# Patient Record
Sex: OTHER/UNKNOWN | Race: BLACK OR AFRICAN AMERICAN | NOT HISPANIC OR LATINO | Employment: FULL TIME | URBAN - METROPOLITAN AREA
[De-identification: names, ages, dates, MRNs, and addresses within clinical notes are randomized per-mention and may not be internally consistent; named-entity substitution may affect disease eponyms.]

---

## 2023-08-10 ENCOUNTER — APPOINTMENT (OUTPATIENT)
Dept: URGENT CARE | Age: 23
End: 2023-08-10
Payer: OTHER MISCELLANEOUS

## 2023-08-10 PROCEDURE — 99213 OFFICE O/P EST LOW 20 MIN: CPT | Performed by: PHYSICIAN ASSISTANT

## 2023-08-24 ENCOUNTER — APPOINTMENT (OUTPATIENT)
Dept: URGENT CARE | Age: 23
End: 2023-08-24
Payer: OTHER MISCELLANEOUS

## 2023-08-24 ENCOUNTER — EVALUATION (OUTPATIENT)
Dept: PHYSICAL THERAPY | Age: 23
End: 2023-08-24
Payer: OTHER MISCELLANEOUS

## 2023-08-24 DIAGNOSIS — M25.511 ACUTE PAIN OF RIGHT SHOULDER: Primary | ICD-10-CM

## 2023-08-24 PROCEDURE — 99213 OFFICE O/P EST LOW 20 MIN: CPT | Performed by: PHYSICIAN ASSISTANT

## 2023-08-24 PROCEDURE — 97161 PT EVAL LOW COMPLEX 20 MIN: CPT | Performed by: PHYSICAL THERAPIST

## 2023-08-24 PROCEDURE — 97110 THERAPEUTIC EXERCISES: CPT | Performed by: PHYSICAL THERAPIST

## 2023-08-24 NOTE — PROGRESS NOTES
PT Evaluation     Today's date: 2023  Patient name: Nando Posada  : 2000  MRN: [id number]  Referring provider: Lee Barron PA-C  Dx:   Encounter Diagnosis     ICD-10-CM    1. Acute pain of right shoulder  M25.511                      Assessment  Assessment details: Patient seen for PT evaluation for R shoulder pain. Patient presents with scapular dyskinesia, decreased R UE ROM and strength 2* acute R shoulder pain. PT initiated HEP training, focusing on strengthening the scapula, stretching the R sided cervical spine to reduce R shoulder pain, PT will continue to progress HEP and will progress to ROM exercises once scapular strength improves. Patient is a good candidate for PT. Recommending 2-3x/week x 3 weeks. Impairments: abnormal or restricted ROM, activity intolerance, impaired physical strength, lacks appropriate home exercise program, pain with function, weight-bearing intolerance and poor body mechanics  Functional limitations: Patient with moderate pain with IADLs, reaching, unable to reach > shoulder height, limitations with household chores and currently out of work. Understanding of Dx/Px/POC: good   Prognosis: good    Goals  Impairment Goals to be met within 4 weeks. - Decrease pain to 2/10 at worst  - Improve ROM by 10-15 degrees R shoulder flexion, abduction  - Increase strength to 5/5 throughout     Functional Goals to be met within 4-6 weeks. - Return to Prior Level of Function  - Increase Functional Status Measure to: expected  - Patient will be independent with HEP  - Patient to be able to return to work.         Plan  Patient would benefit from: skilled physical therapy  Planned modality interventions: cryotherapy, thermotherapy: hydrocollator packs and unattended electrical stimulation  Planned therapy interventions: joint mobilization, IASTM, kinesiology taping, manual therapy, patient education, postural training, strengthening, stretching, therapeutic activities, therapeutic exercise, home exercise program, gait training and body mechanics training  Frequency: 2x week  Duration in weeks: 3  Treatment plan discussed with: patient        Subjective Evaluation    History of Present Illness  Date of onset: 2023  Mechanism of injury: Patient works for [employer] and hurt her shoulder when she was wrapping her pallet. Patient talked to her work and was recommended to see [facility] the following day 2* severe R shoulder pain. Patient initially given work restrictions and medications, no relief of pain; followed up recently and was recommended PT for R shoulder pain. Patient works full time [employer], bending, lifting, walking, pulling; warehouse work.    Patient Goals  Patient goals for therapy: decreased pain, increased motion, increased strength and return to work    Pain  Current pain ratin  At best pain ratin  At worst pain ratin  Location: R shoulder  Quality: sharp, dull ache, cramping and discomfort  Relieving factors: medications  Aggravating factors: overhead activity and lifting  Progression: no change    Social Support    Employment status: working  Hand dominance: right      Diagnostic Tests  No diagnostic tests performed  Treatments  No previous or current treatments        Objective     Postural Observations  Seated posture: fair  Standing posture: fair        Neurological Testing     Sensation     Shoulder   Left Shoulder   Intact: light touch    Right Shoulder   Intact: light touch    Active Range of Motion   Cervical/Thoracic Spine       Cervical    Left lateral flexion:  Restriction level: minimal  Right lateral flexion:  with pain Restriction level minimal  Left rotation:  WFL  Right rotation:  [facility] and with pain  Left Shoulder   Normal active range of motion    Right Shoulder   Flexion: 120 degrees with pain  Abduction: 105 degrees with pain  External rotation BTH: C2 with pain  Internal rotation BTB: sacrum with pain    Left Elbow   Normal active range of motion    Right Elbow   Normal active range of motion    Scapular Mobility   Left Shoulder   Scapular mobility: WFL    Right Shoulder   Scapular mobility: fair    Strength/Myotome Testing     Left Shoulder   Normal muscle strength    Right Shoulder     Planes of Motion   Flexion: 3+   Abduction: 3+   External rotation at 0°: 4-   Internal rotation at 0°: 4-     Left Elbow   Normal strength    Right Elbow   Normal strength             Precautions: none      Manuals 8/24                                       Neuro Re-Ed                                                                Ther Ex                pulleys        scap retraction HEP       TB rows and extension; B        Wall slides flexion? Doorway pec stretch, arms down, R or B HEP               Supine cane flex? S/l shoulder abd?         S/l ER                Prone shld flex, abd, ext' R        Prone rows; R                                        Ther Activity                        Gait Training                        Modalities        CP PRN

## 2023-08-29 ENCOUNTER — OFFICE VISIT (OUTPATIENT)
Dept: PHYSICAL THERAPY | Age: 23
End: 2023-08-29
Payer: OTHER MISCELLANEOUS

## 2023-08-29 DIAGNOSIS — M25.511 ACUTE PAIN OF RIGHT SHOULDER: Primary | ICD-10-CM

## 2023-08-29 PROCEDURE — 97110 THERAPEUTIC EXERCISES: CPT

## 2023-08-29 NOTE — PROGRESS NOTES
Daily Note     Today's date: 2023  Patient name: Yen Laboy  : 2000  MRN: [id number]  Referring provider: Yumiko Ruiz PA-C  Dx:   Encounter Diagnosis     ICD-10-CM    1. Acute pain of right shoulder  M25.511           Start Time: 930  Stop Time: 1015  Total time in clinic (min): 45 minutes    Subjective: Patient reports she has increased right posterior shoulder pain today. Rates pain 6/10 today. Objective: See treatment diary below      Assessment: Patient had fair tolerance to session today. Patient reports discomfort with right shoulder AROM and AAROM. Patient requests ice post treatment but no increased pain reported post treatment. Pt will benefit from continued skilled PT intervention in order to address remaining limitations and to restore maximal function. Plan: Continue per plan of care. Precautions: none      Manuals                                       Neuro Re-Ed                                                                Ther Ex                pulleys  5'      scap retraction HEP 5"x20      TB rows and extension; B  RTB 5" x10      Wall slides flexion? Doorway pec stretch, arms down, R or B HEP 15"x5 arms down              Supine cane flex?   5" x10      S/l shoulder abd?  x10      S/l ER  2x10              Prone shld flex, abd, ext' R  x10 ea      Prone rows; R  x10                                      Ther Activity                        Gait Training                        Modalities        CP PRN  x10

## 2023-08-31 ENCOUNTER — OFFICE VISIT (OUTPATIENT)
Dept: PHYSICAL THERAPY | Age: 23
End: 2023-08-31
Payer: OTHER MISCELLANEOUS

## 2023-08-31 DIAGNOSIS — M25.511 ACUTE PAIN OF RIGHT SHOULDER: Primary | ICD-10-CM

## 2023-08-31 PROCEDURE — 97110 THERAPEUTIC EXERCISES: CPT

## 2023-08-31 NOTE — PROGRESS NOTES
Daily Note     Today's date: 2023  Patient name: Jennifer Simmons  : 2000  MRN: [id number]  Referring provider: Daquan Ingram PA-C  Dx:   Encounter Diagnosis     ICD-10-CM    1. 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 Objective: See treatment diary below      Assessment: Patient tolerated treatment well. UBE added today without difficulty or complaints of discomfort. Patient showed improved tolerance to all exercises today. Pt will benefit from continued skilled PT intervention in order to address remaining limitations and to restore maximal function. No increased pain reported post treatment. Plan: Continue per plan of care. Precautions: none      Manuals                                      Neuro Re-Ed                                                                Ther Ex                pulleys  5' 5'     scap retraction HEP 5"x20 5" x20     TB rows and extension; B  RTB 5" x10 RTB x15     Wall slides flexion? x10             Doorway pec stretch, arms down, R or B HEP 15"x5 arms down 15" x5 arms down             Supine cane flex?   5" x10 5" x10      S/l shoulder abd?  x10 x10     S/l ER  2x10 2x10             Prone shld flex, abd, ext' R  x10 ea x10 ea     Prone rows; R  x10 x10        UBE 2'/2'                             Ther Activity                        Gait Training                        Modalities        CP PRN  x10 x10'

## 2023-09-06 ENCOUNTER — OFFICE VISIT (OUTPATIENT)
Dept: PHYSICAL THERAPY | Age: 23
End: 2023-09-06
Payer: OTHER MISCELLANEOUS

## 2023-09-06 DIAGNOSIS — M25.511 ACUTE PAIN OF RIGHT SHOULDER: Primary | ICD-10-CM

## 2023-09-06 PROCEDURE — 97110 THERAPEUTIC EXERCISES: CPT

## 2023-09-06 NOTE — PROGRESS NOTES
Daily Note     Today's date: 2023  Patient name: Inés Baez  : 2000  MRN: [id number]  Referring provider: Cheryl Lee PA-C  Dx:   Encounter Diagnosis     ICD-10-CM    1. Acute pain of right shoulder  M25.511                      Subjective: Pt reports no new issue or concerns since the last session, pain is improving since last session. Objective: See treatment diary below      Assessment: Tolerated treatment well. Patient able to complete program without pain or increase in symptoms. Patient overall is progressing towards current goals. Patient exhibited good technique with therapeutic exercises. Pt would benefit from further PT for pain management, ROM and strength. Plan: Continue per plan of care. Precautions: none      Manuals 2023                                      Neuro Re-Ed                                                                Ther Ex                pulleys  5' 5' 5'    scap retraction HEP 5"x20 5" x20 5" x20    TB rows and extension; B  RTB 5" x10 RTB x15 Blue x15    Wall slides flexion? x10 x10            Doorway pec stretch, arms down, R or B HEP 15"x5 arms down 15" x5 arms down 15" x5 arms down            Supine cane flex?   5" x10 5" x10  5" x10     S/l shoulder abd?  x10 x10 x10    S/l ER  2x10 2x10 2x10            Prone shld flex, abd, ext' R  x10 ea x10 ea Prone shld flex, abd, ext' R    Prone rows; R  x10 x10 Prone rows; R       UBE 2'/2' UBE 2'/2'                            Ther Activity                        Gait Training                        Modalities        CP PRN  x10 x10' x10

## 2023-09-07 ENCOUNTER — APPOINTMENT (OUTPATIENT)
Dept: URGENT CARE | Age: 23
End: 2023-09-07
Payer: OTHER MISCELLANEOUS

## 2023-09-07 PROCEDURE — 99213 OFFICE O/P EST LOW 20 MIN: CPT | Performed by: PHYSICIAN ASSISTANT

## 2023-09-08 ENCOUNTER — OFFICE VISIT (OUTPATIENT)
Dept: PHYSICAL THERAPY | Age: 23
End: 2023-09-08
Payer: OTHER MISCELLANEOUS

## 2023-09-08 DIAGNOSIS — M25.511 ACUTE PAIN OF RIGHT SHOULDER: Primary | ICD-10-CM

## 2023-09-08 PROCEDURE — 97110 THERAPEUTIC EXERCISES: CPT | Performed by: PHYSICAL THERAPIST

## 2023-09-08 NOTE — PROGRESS NOTES
Daily Note /Discharge Summary    Today's date: 2023  Patient name: Estela Salcido  : 2000  MRN: [id number]  Referring provider: Kelli Forrester PA-C  Dx:   Encounter Diagnosis     ICD-10-CM    1. Acute pain of right shoulder  M25.511                      Subjective: Patient was recommended to return to work from [employer]. Patient reports her shoulder pain has reduced since onset of PT. Objective: See treatment diary below  ROM - WNL   MMT - WNL R shoulder      Assessment: Tolerated treatment well. PT reviewed HEP, recommended patient to continue with her HEP at home and to progress her scapular strength to be able to support her arm as she does heavy lifting and her job is repetitive. Plan: Discharge PT to [facility]. Precautions: none      Manuals 2023                                   Neuro Re-Ed                                                                Ther Ex                pulleys  5' 5' 5' 5'   scap retraction HEP 5"x20 5" x20 5" x20    TB rows and extension; B  RTB 5" x10 RTB x15 Blue x15 Blue 20x ea   Wall slides flexion? x10 x10 -           Doorway pec stretch, arms down, R or B HEP 15"x5 arms down 15" x5 arms down 15" x5 arms down -           Supine cane flex?   5" x10 5" x10  5" x10  10x:05   S/l shoulder abd?  x10 x10 x10 10x   S/l ER  2x10 2x10 2x10 10x           Prone shld flex, abd, ext' R  x10 ea x10 ea Prone shld flex, abd, ext' R 10x ea   Prone rows; R  x10 x10 Prone rows; R 10x R      UBE 2'/2' UBE 2'/2' 2/2 UBE                           Ther Activity                        Gait Training                        Modalities        CP PRN  x10 x10' x10 x10'

## 2024-04-19 ENCOUNTER — HOSPITAL ENCOUNTER (EMERGENCY)
Facility: HOSPITAL | Age: 24
Discharge: HOME | End: 2024-04-19
Attending: EMERGENCY MEDICINE | Admitting: EMERGENCY MEDICINE
Payer: COMMERCIAL

## 2024-04-19 ENCOUNTER — APPOINTMENT (EMERGENCY)
Dept: ULTRASOUND IMAGING | Facility: HOSPITAL | Age: 24
End: 2024-04-19
Payer: COMMERCIAL

## 2024-04-19 VITALS
TEMPERATURE: 99 F | SYSTOLIC BLOOD PRESSURE: 116 MMHG | OXYGEN SATURATION: 100 % | HEART RATE: 90 BPM | DIASTOLIC BLOOD PRESSURE: 58 MMHG | RESPIRATION RATE: 18 BRPM

## 2024-04-19 DIAGNOSIS — O26.859 SPOTTING IN EARLY PREGNANCY: ICD-10-CM

## 2024-04-19 DIAGNOSIS — B34.9 VIRAL SYNDROME: Primary | ICD-10-CM

## 2024-04-19 LAB
ABO GROUP BLD: NORMAL
ANION GAP SERPL CALCULATED.3IONS-SCNC: 8 MMOL/L (ref 4–13)
B-HCG SERPL-ACNC: ABNORMAL MIU/ML (ref 0–0.6)
BACTERIA UR QL AUTO: ABNORMAL /HPF
BASOPHILS # BLD AUTO: 0.02 THOUSANDS/ÂΜL (ref 0–0.1)
BASOPHILS NFR BLD AUTO: 0 % (ref 0–1)
BILIRUB UR QL STRIP: NEGATIVE
BUN SERPL-MCNC: 11 MG/DL (ref 5–25)
CALCIUM SERPL-MCNC: 9 MG/DL (ref 8.4–10.2)
CHLORIDE SERPL-SCNC: 101 MMOL/L (ref 96–108)
CLARITY UR: CLEAR
CO2 SERPL-SCNC: 27 MMOL/L (ref 21–32)
COLOR UR: ABNORMAL
CREAT SERPL-MCNC: 0.68 MG/DL (ref 0.6–1.3)
EOSINOPHIL # BLD AUTO: 0.06 THOUSAND/ÂΜL (ref 0–0.61)
EOSINOPHIL NFR BLD AUTO: 1 % (ref 0–6)
ERYTHROCYTE [DISTWIDTH] IN BLOOD BY AUTOMATED COUNT: 13 % (ref 11.6–15.1)
FLUAV RNA RESP QL NAA+PROBE: NEGATIVE
FLUBV RNA RESP QL NAA+PROBE: NEGATIVE
GLUCOSE SERPL-MCNC: 86 MG/DL (ref 65–140)
GLUCOSE UR STRIP-MCNC: NEGATIVE MG/DL
HCT VFR BLD AUTO: 35.2 % (ref 36.5–46.1)
HGB BLD-MCNC: 11.9 G/DL (ref 12–15.4)
HGB UR QL STRIP.AUTO: NEGATIVE
IMM GRANULOCYTES # BLD AUTO: 0.03 THOUSAND/UL (ref 0–0.2)
IMM GRANULOCYTES NFR BLD AUTO: 0 % (ref 0–2)
KETONES UR STRIP-MCNC: NEGATIVE MG/DL
LEUKOCYTE ESTERASE UR QL STRIP: NEGATIVE
LIPASE SERPL-CCNC: 17 U/L (ref 11–82)
LYMPHOCYTES # BLD AUTO: 2.62 THOUSANDS/ÂΜL (ref 0.6–4.47)
LYMPHOCYTES NFR BLD AUTO: 28 % (ref 14–44)
MCH RBC QN AUTO: 29.8 PG (ref 26.8–34.3)
MCHC RBC AUTO-ENTMCNC: 33.8 G/DL (ref 31.4–37.4)
MCV RBC AUTO: 88 FL (ref 82–98)
MONOCYTES # BLD AUTO: 0.69 THOUSAND/ÂΜL (ref 0.17–1.22)
MONOCYTES NFR BLD AUTO: 7 % (ref 4–12)
MUCOUS THREADS UR QL AUTO: ABNORMAL
NEUTROPHILS # BLD AUTO: 6.12 THOUSANDS/ÂΜL (ref 1.85–7.62)
NEUTS SEG NFR BLD AUTO: 64 % (ref 43–75)
NITRITE UR QL STRIP: NEGATIVE
NON-SQ EPI CELLS URNS QL MICRO: ABNORMAL /HPF
NRBC BLD AUTO-RTO: 0 /100 WBCS
PH UR STRIP.AUTO: 6 [PH]
PLATELET # BLD AUTO: 291 THOUSANDS/UL (ref 149–390)
PMV BLD AUTO: 9.3 FL (ref 8.9–12.7)
POTASSIUM SERPL-SCNC: 3.5 MMOL/L (ref 3.5–5.3)
PROT UR STRIP-MCNC: ABNORMAL MG/DL
RBC # BLD AUTO: 3.99 MILLION/UL (ref 3.88–5.12)
RBC #/AREA URNS AUTO: ABNORMAL /HPF
RH BLD: POSITIVE
RSV RNA RESP QL NAA+PROBE: NEGATIVE
S PYO DNA THROAT QL NAA+PROBE: NOT DETECTED
SARS-COV-2 RNA RESP QL NAA+PROBE: NEGATIVE
SODIUM SERPL-SCNC: 136 MMOL/L (ref 135–147)
SP GR UR STRIP.AUTO: 1.03 (ref 1–1.03)
UROBILINOGEN UR STRIP-ACNC: <2 MG/DL
WBC # BLD AUTO: 9.54 THOUSAND/UL (ref 4.31–10.16)
WBC #/AREA URNS AUTO: ABNORMAL /HPF

## 2024-04-19 PROCEDURE — 96366 THER/PROPH/DIAG IV INF ADDON: CPT

## 2024-04-19 PROCEDURE — 85025 COMPLETE CBC W/AUTO DIFF WBC: CPT

## 2024-04-19 PROCEDURE — 87651 STREP A DNA AMP PROBE: CPT

## 2024-04-19 PROCEDURE — 0241U HB NFCT DS VIR RESP RNA 4 TRGT: CPT

## 2024-04-19 PROCEDURE — 96365 THER/PROPH/DIAG IV INF INIT: CPT

## 2024-04-19 PROCEDURE — 86900 BLOOD TYPING SEROLOGIC ABO: CPT | Performed by: EMERGENCY MEDICINE

## 2024-04-19 PROCEDURE — 99284 EMERGENCY DEPT VISIT MOD MDM: CPT

## 2024-04-19 PROCEDURE — 86308 HETEROPHILE ANTIBODY SCREEN: CPT | Performed by: EMERGENCY MEDICINE

## 2024-04-19 PROCEDURE — 36415 COLL VENOUS BLD VENIPUNCTURE: CPT

## 2024-04-19 PROCEDURE — 84702 CHORIONIC GONADOTROPIN TEST: CPT

## 2024-04-19 PROCEDURE — 99284 EMERGENCY DEPT VISIT MOD MDM: CPT | Performed by: EMERGENCY MEDICINE

## 2024-04-19 PROCEDURE — 76801 OB US < 14 WKS SINGLE FETUS: CPT

## 2024-04-19 PROCEDURE — 86901 BLOOD TYPING SEROLOGIC RH(D): CPT | Performed by: EMERGENCY MEDICINE

## 2024-04-19 PROCEDURE — 80048 BASIC METABOLIC PNL TOTAL CA: CPT

## 2024-04-19 PROCEDURE — 83690 ASSAY OF LIPASE: CPT

## 2024-04-19 PROCEDURE — 81001 URINALYSIS AUTO W/SCOPE: CPT

## 2024-04-19 RX ADMIN — SODIUM CHLORIDE, SODIUM LACTATE, POTASSIUM CHLORIDE, AND CALCIUM CHLORIDE 1000 ML: .6; .31; .03; .02 INJECTION, SOLUTION INTRAVENOUS at 19:45

## 2024-04-19 NOTE — Clinical Note
Ayesha Tomlinson was seen and treated in our emergency department on 4/19/2024.                Diagnosis:     Ayesha  may return to work on return date.    Ayesha may return on this date: 04/22/2024    Please also allow Ayesha to drink as frequently they need to while at work.     If you have any questions or concerns, please don't hesitate to call.      Renetta Williamson, DO    ______________________________           _______________          _______________  Hospital Representative                              Date                                Time

## 2024-04-19 NOTE — ED ATTENDING ATTESTATION
4/19/2024  I, Chan Hernandez MD, saw and evaluated the patient. I have discussed the patient with the resident/non-physician practitioner and agree with the resident's/non-physician practitioner's findings, Plan of Care, and MDM as documented in the resident's/non-physician practitioner's note, except where noted. All available labs and Radiology studies were reviewed.  I was present for key portions of any procedure(s) performed by the resident/non-physician practitioner and I was immediately available to provide assistance.       At this point I agree with the current assessment done in the Emergency Department.  I have conducted an independent evaluation of this patient a history and physical is as follows:    23-year-old female G2, P1 at about 6 weeks gestation presented for evaluation of some vaginal spotting last night into early this morning which has since improved.  Also notes flulike symptoms since yesterday.  Having fevers as well as some fatigue, cough.    ED Course  ED Course as of 04/20/24 0037   Fri Apr 19, 2024 2046 ABO Grouping: O   2046 Rh Factor: Positive   2046 HCG QUANTITATIVE(!): 100,157.1   2147 Ultrasound shows live IUP at 6 weeks 5 days.  Plan discharge home.  Routine OB/GYN follow-up.     COVID/flu/RSV negative.  Plan discharge home.  Continue routine prenatal care.  Tylenol as needed for any discomfort.  Increase fluid intake.    Critical Care Time  Procedures

## 2024-04-19 NOTE — ED PROVIDER NOTES
History  Chief Complaint   Patient presents with    Flu Symptoms     Patient reports flu like symptoms for past 2 days. States she called off work yesterday for fatigue and fever.    Vaginal Bleeding - Pregnant     States she began with vaginal bleeding. Describes as spotting. Reporting L sided abd cramping. Rates pain 4/10     HPI  23-year-old nonbinary person who presents with lower abdominal pain and vaginal spotting.  Patient describes left-sided abdominal pain described as cramping.  Patient states that they had a positive pregnancy test, G2, P1.  Has not had formal ultrasound.  Does have an appointment with OB.  Associated mild nausea that is resolved.  Also reports associated fatigue, cough, congestion, rhinorrhea.  Last menstrual period 3/4/2024.      Prior to Admission Medications   Prescriptions Last Dose Informant Patient Reported? Taking?   amoxicillin-clavulanate (AUGMENTIN) 875-125 mg per tablet   Yes No   Sig: Take 1 tablet by mouth every 12 (twelve) hours   ergocalciferol (VITAMIN D2) 50,000 units   No No   Sig: Take 1 capsule (50,000 Units total) by mouth once a week   ibuprofen (MOTRIN) 400 mg tablet   No No   Sig: Take 1 tablet (400 mg total) by mouth every 6 (six) hours as needed for mild pain for up to 5 days   norelgestromin-ethinyl estradiol (ORTHO EVRA) 150-35 MCG/24HR   Yes No   Sig: Place 1 patch on the skin once a week   varenicline (CHANTIX) 0.5 mg tablet   No No   Sig: TAKE ONE TABLET BY MOUTH DAILY FOR 3 DAYS THEN ONE TABLET TWICE DAILY FOR 4 DAYS   varenicline (CHANTIX) 1 mg tablet   No No   Sig: Take 1 tablet (1 mg total) by mouth 2 (two) times a day      Facility-Administered Medications: None       Past Medical History:   Diagnosis Date    Allergic     Anemia     Anxiety     Depression     Headache(784.0)        Past Surgical History:   Procedure Laterality Date     SECTION      CHOLECYSTECTOMY         No family history on file.  I have reviewed and agree with the history as  documented.    E-Cigarette/Vaping    E-Cigarette Use Never User      E-Cigarette/Vaping Substances     Social History     Tobacco Use    Smoking status: Every Day     Current packs/day: 0.50     Types: Cigarettes    Smokeless tobacco: Never   Vaping Use    Vaping status: Never Used   Substance Use Topics    Alcohol use: Yes     Alcohol/week: 6.0 - 8.0 standard drinks of alcohol     Types: 2 Glasses of wine, 4 - 6 Shots of liquor per week    Drug use: Yes     Types: Marijuana     Comment: rare        Review of Systems   Constitutional:  Negative for chills and fever.   HENT:  Positive for congestion and rhinorrhea. Negative for ear pain and sore throat.    Eyes:  Negative for pain and visual disturbance.   Respiratory:  Positive for cough. Negative for shortness of breath.    Cardiovascular:  Negative for chest pain and palpitations.   Gastrointestinal:  Positive for abdominal pain and nausea. Negative for blood in stool, constipation, diarrhea and vomiting.   Genitourinary:  Positive for vaginal bleeding. Negative for dysuria, hematuria and vaginal discharge.   Musculoskeletal:  Negative for arthralgias and back pain.   Skin:  Negative for color change and rash.   Neurological:  Negative for seizures, syncope, light-headedness and headaches.   All other systems reviewed and are negative.      Physical Exam  ED Triage Vitals [04/19/24 1855]   Temperature Pulse Respirations Blood Pressure SpO2   99 °F (37.2 °C) 90 18 116/58 100 %      Temp Source Heart Rate Source Patient Position - Orthostatic VS BP Location FiO2 (%)   Oral -- -- -- --      Pain Score       4             Orthostatic Vital Signs  Vitals:    04/19/24 1855   BP: 116/58   Pulse: 90       Physical Exam  Vitals and nursing note reviewed.   Constitutional:       General: Ayesha is not in acute distress.     Appearance: Ayesha is well-developed.   HENT:      Head: Normocephalic and atraumatic.      Mouth/Throat:      Mouth: Mucous membranes are moist.    Eyes:      Conjunctiva/sclera: Conjunctivae normal.   Cardiovascular:      Rate and Rhythm: Normal rate and regular rhythm.      Heart sounds: No murmur heard.  Pulmonary:      Effort: Pulmonary effort is normal. No respiratory distress.      Breath sounds: Normal breath sounds.   Abdominal:      General: Bowel sounds are normal.      Palpations: Abdomen is soft.      Tenderness: There is abdominal tenderness in the suprapubic area and left lower quadrant. There is no right CVA tenderness, left CVA tenderness, guarding or rebound.   Musculoskeletal:         General: No swelling.      Cervical back: Neck supple.   Skin:     General: Skin is warm and dry.      Capillary Refill: Capillary refill takes less than 2 seconds.   Neurological:      Mental Status: Ayesha is alert.   Psychiatric:         Mood and Affect: Mood normal.         ED Medications  Medications   lactated ringers bolus 1,000 mL (0 mL Intravenous Stopped 4/19/24 2149)       Diagnostic Studies  Results Reviewed       Procedure Component Value Units Date/Time    Mononucleosis screen [[id number]]  (Normal) Collected: 04/19/24 1936    Lab Status: Final result Specimen: Blood from Arm, Right Updated: 04/20/24 1429     Monotest Negative    Quantitative hCG [[id number]]  (Abnormal) Collected: 04/19/24 1935    Lab Status: Final result Specimen: Blood from Arm, Right Updated: 04/19/24 2038     HCG, Quant 100,157.1 mIU/mL     Narrative:       Expected Ranges:    HCG results between 5.0 and 25.0 mIU/mL may be indicative of early pregnancy but should be interpreted in light of the total clinical presentation.    HCG can rise to detectable levels in eric and post menopausal women (0-11.6 mIU/mL).     Approximate               Approximate HCG  Gestation age          Concentration ( mIU/mL)  _____________          ______________________   Weeks                      HCG values  0.2-1                       5-50  1-2                           2-3                          100-5000  3-4                         500-11764  4-5                         1000-75820  5-6                         76132-077789  6-8                         10295-408587  8-12                        08779-192778      FLU/RSV/COVID - if FLU/RSV clinically relevant [[id number]]  (Normal) Collected: 04/19/24 1935    Lab Status: Final result Specimen: Nares from Nose Updated: 04/19/24 2028     SARS-CoV-2 Negative     INFLUENZA A PCR Negative     INFLUENZA B PCR Negative     RSV PCR Negative    Narrative:      FOR PEDIATRIC PATIENTS - copy/paste COVID Guidelines URL to browser: [email or web address]    SARS-CoV-2 assay is a Nucleic Acid Amplification assay intended for the  qualitative detection of nucleic acid from SARS-CoV-2 in nasopharyngeal  swabs. Results are for the presumptive identification of SARS-CoV-2 RNA.    Positive results are indicative of infection with SARS-CoV-2, the virus  causing COVID-19, but do not rule out bacterial infection or co-infection  with other viruses. Laboratories within the [place] and its  territories are required to report all positive results to the appropriate  public health authorities. Negative results do not preclude SARS-CoV-2  infection and should not be used as the sole basis for treatment or other  patient management decisions. Negative results must be combined with  clinical observations, patient history, and epidemiological information.  This test has not been FDA cleared or approved.    This test has been authorized by FDA under an Emergency Use Authorization  (EUA). This test is only authorized for the duration of time the  declaration that circumstances exist justifying the authorization of the  emergency use of an in vitro diagnostic tests for detection of SARS-CoV-2  virus and/or diagnosis of COVID-19 infection under section 564(b)(1) of  the Act, 21 U.S.C. 360bbb-3(b)(1), unless the authorization is  terminated  or revoked sooner. The test has been validated but independent review by FDA  and CLIA is pending.    Test performed using [employer] Gene[employer]pert: This RT-PCR assay targets N2,  a region unique to SARS-CoV-2. A conserved region in the E-gene was chosen  for pan-Sarbecovirus detection which includes SARS-CoV-2.    According to CMS-2020-01-R, this platform meets the definition of high-throughput technology.    Strep A PCR [[id number]]  (Normal) Collected: 04/19/24 1935    Lab Status: Final result Specimen: Throat Updated: 04/19/24 2012     STREP A PCR Not Detected    Basic metabolic panel [[id number]] Collected: 04/19/24 1935    Lab Status: Final result Specimen: Blood from Arm, Right Updated: 04/19/24 2008     Sodium 136 mmol/L      Potassium 3.5 mmol/L      Chloride 101 mmol/L      CO2 27 mmol/L      ANION GAP 8 mmol/L      BUN 11 mg/dL      Creatinine 0.68 mg/dL      Glucose 86 mg/dL      Calcium 9.0 mg/dL      eGFR --    Narrative:      Notes:     1. eGFR calculation is only valid for adults 18 years and older.  2. EGFR calculation cannot be performed for patients who are transgender, non-binary, or whose legal sex, sex at birth, and gender identity differ.    Lipase [[id number]]  (Normal) Collected: 04/19/24 1935    Lab Status: Final result Specimen: Blood from Arm, Right Updated: 04/19/24 2008     Lipase 17 u/L     Urine Microscopic [[id number]]  (Abnormal) Collected: 04/19/24 1945    Lab Status: Final result Specimen: Urine, Clean Catch Updated: 04/19/24 1957     RBC, UA 1-2 /hpf      WBC, UA 1-2 /hpf      Epithelial Cells Occasional /hpf      Bacteria, UA None Seen /hpf      MUCUS THREADS Occasional    UA w Reflex to Microscopic w Reflex to Culture [[id number]]  (Abnormal) Collected: 04/19/24 1945    Lab Status: Final result Specimen: Urine, Clean Catch Updated: 04/19/24 1956     Color, UA Light Yellow     Clarity, UA Clear     Specific Gravity, UA 1.031     pH, UA 6.0     Leukocytes, UA Negative      Nitrite, UA Negative     Protein, UA Trace mg/dl      Glucose, UA Negative mg/dl      Ketones, UA Negative mg/dl      Urobilinogen, UA <2.0 mg/dl      Bilirubin, UA Negative     Occult Blood, UA Negative    CBC and differential [[id number]]  (Abnormal) Collected: 04/19/24 1935    Lab Status: Final result Specimen: Blood from Arm, Right Updated: 04/19/24 1943     WBC 9.54 Thousand/uL      RBC 3.99 Million/uL      Hemoglobin 11.9 g/dL      Hematocrit 35.2 %      MCV 88 fL      MCH 29.8 pg      MCHC 33.8 g/dL      RDW 13.0 %      MPV 9.3 fL      Platelets 291 Thousands/uL      nRBC 0 /100 WBCs      Segmented % 64 %      Immature Grans % 0 %      Lymphocytes % 28 %      Monocytes % 7 %      Eosinophils Relative 1 %      Basophils Relative 0 %      Absolute Neutrophils 6.12 Thousands/µL      Absolute Immature Grans 0.03 Thousand/uL      Absolute Lymphocytes 2.62 Thousands/µL      Absolute Monocytes 0.69 Thousand/µL      Eosinophils Absolute 0.06 Thousand/µL      Basophils Absolute 0.02 Thousands/µL                    US OB < 14 weeks with transvaginal   Final Result by Vazquez Gillis MD (04/19 2139)      Single live intrauterine gestation at 6 weeks 5 days (range +/- 3 days).      TERESA of 12/8/2024.            Workstation performed: [id number]               Procedures  POC Pelvic US    Date/Time: 4/19/2024 7:57 PM    Performed by: Renetta Williamson DO  Authorized by: Renetta Williamson DO    Procedure details:     Exam Type:  Diagnostic    Indications: evaluate for IUP, pregnant with abdominal pain and pregnant with vaginal bleeding      Assessment for: free pelvic fluid      Assessment for comment:  FHT    Technique:  Transabdominal obstetric (HCG+) exam    Views obtained: left adnexa, right adnexa, uterus (transverse and sagittal) and pouch of Humza      Image availability:  Images available in PACS  Uterine findings:     Yolk sac: identified      Fetal pole: identified      Fetal heart rate: identified       Fetal heart rate (bpm):  128  Right adnexa findings:     Right ovary:  Visualized  Left adnexa findings:     Left ovary:  Visualized  Interpretation:     Pregnancy findings: intrauterine pregnancy (IUP)    Comments:      Will still obtain formal transvaginal US        ED Course  ED Course as of 04/23/24 0247   Fri Apr 19, 2024 1945 CBC and differential(!)  No leukocytosis or leukopenia.  Hemoglobin slightly decreased.  Hematocrit equivalent.  Platelets within normal limits.  Differential reassuring.   1956 UA w Reflex to Microscopic w Reflex to Culture(!)  Elevated specific gravity, trace protein, negative leukocytes, negative nitrates.  Consistent with dehydration   1957 Urine Microscopic(!)  No bacteria seen.   2009 LIPASE: 17   2009 Basic metabolic panel  Within normal limits   2021 ABO Grouping: O   2021 Rh Factor: Positive   2021 STREP A PCR: Not Detected   2034 SARS-COV-2: Negative   2034 INFLU A PCR: Negative   2034 INFLU B PCR: Negative   2034 RSV PCR: Negative   2038 HCG QUANTITATIVE(!): 100,157.1   2129 Patient tolerating PO   2143 US OB < 14 weeks with transvaginal  IMPRESSION:     Single live intrauterine gestation at 6 weeks 5 days (range +/- 3 days).     TERESA of 12/8/2024.                               SBIRT 22yo+      Flowsheet Row Most Recent Value   Initial Alcohol Screen: US AUDIT-C     1. How often do you have a drink containing alcohol? 0 Filed at: 04/19/2024 1856   2. How many drinks containing alcohol do you have on a typical day you are drinking?  0 Filed at: 04/19/2024 1856   3a. Male UNDER 65: How often do you have five or more drinks on one occasion? 0 Filed at: 04/19/2024 1856   3b. FEMALE Any Age, or MALE 65+: How often do you have 4 or more drinks on one occassion? 0 Filed at: 04/19/2024 1856   Audit-C Score 0 Filed at: 04/19/2024 1856   BETI: How many times in the past year have you...    Used an illegal drug or used a prescription medication for non-medical reasons? Never Filed at:  04/19/2024 1856                  Medical Decision Making  24 yo NB person presented to ED for abd pain vaginal spotting. Associated symptoms: fatigue, cough, congestion, nausea (resolved). Exam findings: heart RRR, lung CTAB, abd soft nonTTP.  Differentials diagnoses considered: IUP vs ectopic vs viral syndrome.  See ED course for more details on lab and imaging interpretation.  Based on these results and H&P, this is most likely due to dehydration, viral syndrome, pregnancy. Results and clinical impressions were discussed with patient and family. They expressed understanding. Plan: Discharge patient home with instruction to follow-up with OB, instructed to follow-up with primary care doctor, instructed to return to the emergency room for any worsening symptoms. This plan was also discussed with patient, who was agreeable with this plan. Patient was given the opportunity to ask questions in ED. All questions and concerns were addressed in ED.     Amount and/or Complexity of Data Reviewed  Labs: ordered. Decision-making details documented in ED Course.  Radiology: ordered. Decision-making details documented in ED Course.          Disposition  Final diagnoses:   Viral syndrome   Spotting in early pregnancy     Time reflects when diagnosis was documented in both MDM as applicable and the Disposition within this note       Time User Action Codes Description Comment    4/19/2024  9:43 PM Renetta Williamson Add [B34.9] Viral syndrome     4/19/2024  9:43 PM Renetta Williamson Add [O20.0] Threatened miscarriage in early pregnancy     4/19/2024  9:45 PM Renetta Williamson Add [O26.859] Spotting in early pregnancy     4/19/2024  9:46 PM Renetta Williamson Remove [O20.0] Threatened miscarriage in early pregnancy           ED Disposition       ED Disposition   Discharge    Condition   Stable    Date/Time   Fri Apr 19, 2024 2145    Comment   Ayesha Tomlinson discharge to home/self care.                   Follow-up Information        Follow up With Specialties Details Why Contact Info Additional Information    Your OBGYN  Call  as soon as possible to schedule follow-up      [facility] Emergency Department Emergency Medicine Go to  As needed, If symptoms worsen [address] [facility] [id number] [phone] [facility] Emergency Department, [address], [place], [address]    Your Primary Care Doctor  Schedule an appointment as soon as possible for a visit  let them know you were evaluated in the ER and would like to schedule a follow-up appointment, As needed              Discharge Medication List as of 4/19/2024  9:46 PM        CONTINUE these medications which have NOT CHANGED    Details   amoxicillin-clavulanate (AUGMENTIN) 875-125 mg per tablet Take 1 tablet by mouth every 12 (twelve) hours, Starting Thu 12/29/2022, Historical Med      ergocalciferol (VITAMIN D2) 50,000 units Take 1 capsule (50,000 Units total) by mouth once a week, Starting Sun 1/15/2023, Normal      ibuprofen (MOTRIN) 400 mg tablet Take 1 tablet (400 mg total) by mouth every 6 (six) hours as needed for mild pain for up to 5 days, Starting Thu 12/30/2021, Until Mon 3/27/2023 at 2359, Print      norelgestromin-ethinyl estradiol (ORTHO EVRA) 150-35 MCG/24HR Place 1 patch on the skin once a week, Starting Fri 5/13/2022, Until Sat 5/13/2023, Historical Med      varenicline (CHANTIX) 0.5 mg tablet TAKE ONE TABLET BY MOUTH DAILY FOR 3 DAYS THEN ONE TABLET TWICE DAILY FOR 4 DAYS, Normal           No discharge procedures on file.    PDMP Review       None             ED Provider  Attending physically available and evaluated Ayesha Tomlinson. I managed the patient along with the ED Attending.    Electronically Signed by           Renetta Williamson DO  04/23/24 1895

## 2024-04-20 LAB — HETEROPH AB SER QL: NEGATIVE

## 2024-04-29 NOTE — PROGRESS NOTES
S: 23 y.o.  who presents for viability scan with LMP of 24. She is 8 weeks and 1 days by her LMP. She reports occasional headaches, nausea. She denies cramping or vaginal bleeding. This is a planned and welcomed pregnancy. Her previous pregnancies complicated by LTCS.     Past Medical History:   Diagnosis Date    Allergic     Anemia     Anxiety     Depression     Headache(784.0)     Migraine        OB History    Para Term  AB Living   1 1 1     1   SAB IAB Ectopic Multiple Live Births           1      # Outcome Date GA Lbr Marc/2nd Weight Sex Delivery Anes PTL Lv   1 Term 17 40w5d  3215 g (7 lb 1.4 oz) F CS-LTranv Spinal  MICHI        O:  Vitals:    24 1030   BP: 100/62   BP Location: Left arm   Patient Position: Sitting   Cuff Size: Standard   Weight: 71.1 kg (156 lb 12.8 oz)       TVUS indicates viable, belle IUP measuring 18.1 mm, correlates with CRL of 8w2d. TERESA based off LMP for 24. FHT: 170.      A/P:  1. Viable pregnancy on TVUS  2. MFM referral placed.  3. RTC in 2 week for nurse intake visit    Problem List Items Addressed This Visit    None  Visit Diagnoses       Amenorrhea    -  Primary    Relevant Orders    [facility] US OB < 14 weeks single or first gestation level 1 (Completed)    Early stage of pregnancy        Relevant Orders    Ambulatory Referral to Maternal Fetal Medicine

## 2024-04-30 ENCOUNTER — ULTRASOUND (OUTPATIENT)
Dept: OBGYN CLINIC | Facility: CLINIC | Age: 24
End: 2024-04-30

## 2024-04-30 ENCOUNTER — TELEPHONE (OUTPATIENT)
Age: 24
End: 2024-04-30

## 2024-04-30 VITALS — SYSTOLIC BLOOD PRESSURE: 100 MMHG | DIASTOLIC BLOOD PRESSURE: 62 MMHG | BODY MASS INDEX: 27.78 KG/M2 | WEIGHT: 156.8 LBS

## 2024-04-30 DIAGNOSIS — Z34.90 EARLY STAGE OF PREGNANCY: ICD-10-CM

## 2024-04-30 DIAGNOSIS — N91.2 AMENORRHEA: Primary | ICD-10-CM

## 2024-04-30 NOTE — PATIENT INSTRUCTIONS
At Checkout, make an appointment for OB Nurse Intake and Education in 2 weeks.  Please schedule future prenatal visits at checkout or by calling the [place] office at [phone]. Next appointment with a provider is in 4 weeks and will be a physical exam.   Call Maternal fetal medicine to establish care.    [email or web address]

## 2024-05-17 ENCOUNTER — INITIAL PRENATAL (OUTPATIENT)
Dept: OBGYN CLINIC | Facility: CLINIC | Age: 24
End: 2024-05-17
Payer: COMMERCIAL

## 2024-05-17 VITALS
BODY MASS INDEX: 28.21 KG/M2 | SYSTOLIC BLOOD PRESSURE: 124 MMHG | WEIGHT: 159.2 LBS | HEIGHT: 63 IN | DIASTOLIC BLOOD PRESSURE: 76 MMHG

## 2024-05-17 DIAGNOSIS — Z31.430 ENCOUNTER OF FEMALE FOR TESTING FOR GENETIC DISEASE CARRIER STATUS FOR PROCREATIVE MANAGEMENT: ICD-10-CM

## 2024-05-17 DIAGNOSIS — E55.9 VITAMIN D DEFICIENCY: ICD-10-CM

## 2024-05-17 DIAGNOSIS — Z3A.10 10 WEEKS GESTATION OF PREGNANCY: ICD-10-CM

## 2024-05-17 DIAGNOSIS — Z34.81 PRENATAL CARE, SUBSEQUENT PREGNANCY, FIRST TRIMESTER: Primary | ICD-10-CM

## 2024-05-17 PROCEDURE — 99211 OFF/OP EST MAY X REQ PHY/QHP: CPT | Performed by: NURSE PRACTITIONER

## 2024-05-17 NOTE — PATIENT INSTRUCTIONS
Congratulations!! Please review our Pregnancy Essential Guide and [facility] L&D Virtual tour from our networks website.     [facility] Pregnancy Essentials Guide  [facility] ([email or web address])     [facility] - Virtual Tour ([employer])        Elvin Tomlinson,     It was so nice getting to know you today. Remember if you have an urgent or time sensitive concern, please call the practice phone number so a clinical triage team member can review your symptoms and get in touch with our on call provider if necessary. If you have general questions or need help navigating our services please REPLY to this message so it comes directly to me and I will respond between other patients. If I am out of the office for any reason, another nurse navigator may reach out to help you. Our Pregnancy Essential Guide is a great online resource--please use the link below.     [facility] Pregnancy Essentials Guide  [facility] ([email or web address])     Again, Congratulations and Thank You for choosing [facility]. I look forward to helping you through this journey.

## 2024-05-17 NOTE — PROGRESS NOTES
OB INTAKE INTERVIEW  Patient is 23 y.o. who presents for OB intake at 10 wks 4 days  She is accompanied by the father of the baby during this encounter  The father of her baby (Saud Irvin) is involved in the pregnancy and is 30 years old.  1st pregnancy together - FOB's 1st pregnancy      Last Menstrual Period: 3/4/2024  Ultrasound: Measured 8  weeks 2 days on 2024  Estimated Date of Delivery: 2024  confirmed by US    Signs/Symptoms of Pregnancy  Current pregnancy symptoms: nausea, occas vomiting, fatigue, breast tenderness, h/a  Constipation no (looser BMs) - lactose intolerant  Headaches YES (sev times/wk) - discussed taking Magnesium 400 mg/day & vit B2 400 mg/day  Cramping/spotting YES (had spotting & cramping 2024 - seen in ED 2024 - no spotting since 2024  PICA cravings no    Diabetes-  There is no height or weight on file to calculate BMI.  If patient has 1 or more, please order early 1 hour GTT  History of GDM no  BMI >35 no  History of PCOS or current metformin use no  History of LGA/macrosomic infant (4000g/9lbs) no    If patient has 2 or more, please order early 1 hour GTT  BMI>30 no  AMA no  First degree relative with type 2 diabetes no  History of chronic HTN, hyperlipidemia, elevated A1C no  High risk race (, , ,  or ) YES    Hypertension- if you answer yes to any of the following, please order baseline preeclampsia labs (cbc, comprehensive metabolic panel, urine protein creatinine ratio, uric acid)  History of of chronic HTN no  History of gestational HTN no  History of preeclampsia, eclampsia, or HELLP syndrome no  History of diabetes no  History of lupus, autoimmune disease, kidney disease no    Thyroid- if yes order TSH with reflex T4  History of thyroid disease no    Bleeding Disorder or Hx of DVT-patient or first degree relative with history of. Order the following if not done previously.   (Factor V,  antithrombin III, prothrombin gene mutation, protein C and S Ag, lupus anticoagulant, anticardiolipin, beta-2 glycoprotein)   no    OB/GYN-  History of abnormal pap smear no       Date of last pap smear 2021  History of HPV no  History of Herpes/HSV no  History of other STI (gonorrhea, chlamydia, trich) YES - treated for chlamydia x 2 (1st pregnancy)  History of prior  no  History of prior  YES  History of  delivery prior to 36 weeks 6 days no  History of blood transfusion no  Ok for blood transfusion YES    Substance screening-   History of tobacco use YES  Currently using tobacco no (stopped mid 2024)  Substance Use Screen Level (N/A, LOW, HIGH)     MRSA Screening-   Does the pt have a hx of MRSA? no    Immunizations:  Influenza vaccine given this season NO - recommended in pregnancy  Discussed Tdap vaccine YES  Discussed COVID Vaccine YES - patient had Covid vaccine x 2, patient had (+) Covid x 2    Genetic/MFM-  Do you or your partner have a history of any of the following in yourselves or first degree relatives?  Cystic fibrosis no  Spinal muscular atrophy no  Hemoglobinopathy/Sickle Cell/Thalassemia no  Fragile X Intellectual Disability no    If yes, discuss Carrier Screening and recommend consultation with MFM/Genetic Counseling and place specific [facility] Referral for.    If no, discuss Carrier Screening being completed once in a lifetime as a standard of care lab test. Place orders for Cystic Fibrosis Gene Test (CXM431) and Spinal Muscular Atrophy DNA ([id number]) - ordered      Appointment for Nuchal Translucency Ultrasound at [facility] scheduled for 2024      Interview education  [facility] Pregnancy Essentials Book reviewed, discussed and attached to their AVS YES    Nurse/Family Partnership- patient may qualify; referral placed YES    Prenatal lab work scripts YES ([facility])  Extra labs ordered:  Vit D, SMA/CF carrier screening    Aspirin/Preeclampsia Screen    Risk Level Risk Factor  Recommendation   LOW Prior Uncomplicated full-term delivery YES No Aspirin recommendation        MODERATE Nulliparity no Recommend low-dose aspirin if     BMI>30 no 2 or more moderate risk factors    Family History Preeclampsia (mother/sister) YES     35yr old or greater no     Black Race, Concern for SDOH/Low Socioeconomic YES     IVF Pregnancy  no     Personal History Risks (low birth weight, prior adverse preg outcome, >10yr preg interval) no         HIGH History of Preeclampsia no Recommend low-dose aspirin if     Multifetal gestation no 1 or more high risk factors    Chronic HTN no     Type 1 or 2 Diabetes no     Renal Disease no     Autoimmune Disease  no      Contraindications to ASA therapy:  NSAID/ ASA allergy: no  Nasal polyps: no  Asthma with history of ASA induced bronchospasm: no  Relative contraindications:  History of GI bleed: no  Active peptic ulcer disease: no  Severe hepatic dysfunction: no    Patient should be recommended to take ASA 162mg during this pregnancy from 12-36wks to lower her risk of preeclampsia: MODERATE RISK - discussed LDASA 12-36 wks      The patient has a history now or in prior pregnancy notable for:  - previous C/S  2017 - induction, oligohydramnios - interested in   - patient previously had Hgb electrophoresis last pregnancy  - hx vit D deficiency (labs done last 2023)  - former smoker - quit with this pregnancy  - hx depression (no meds since age 11 ) - EPDS score today = 10  - hx marijiuana use  - hx lactose intolerance, migraine h/a      Details that I feel the provider should be aware of: see above    PN1 visit scheduled. The patient was oriented to our practice, the navigator role, reviewed delivering physicians and [facility] for Delivery. All questions were answered.    Interviewed by: CHRISTINE Contreras RN

## 2024-05-21 ENCOUNTER — NURSE TRIAGE (OUTPATIENT)
Age: 24
End: 2024-05-21

## 2024-05-21 NOTE — TELEPHONE ENCOUNTER
"Patient is 11 weeks pregnant calling with onset of moderate to severe diarrhea since yesterday. States yesterday had >6 episodes, today since 11:30am had about 3-4 episodes. Yesterday had fever and 1 episode of vomiting. Pt states able to keep fluids down. Has trouble with food because usually after eating will end of in the bathroom about 20 minutes after. States she is fatigued and has chapped lips. Pt urinates normally, denies dizziness or light headedness. Pt does note came in contact with a sick person who had similar symptoms. RN advised continued hydration and eating a light bland diet. Advised to continue monitoring symptoms and if unable to keep fluids down for 24 hours or more, or begins urinating much less, or feels like she is about to pass out - go to ED for IV fluids. Pt verbalized an understanding. No further questions.     Reason for Disposition   MILD-MODERATE diarrhea (e.g., 1-6 times / day more than normal)    Answer Assessment - Initial Assessment Questions  1. DIARRHEA SEVERITY: \"How bad is the diarrhea?\" \"How many extra stools have you had in the past 24 hours than normal?\"     - NO DIARRHEA (SCALE 0)    - MILD (SCALE 1-3): Few loose or mushy BMs; increase of 1-3 stools over normal daily number of stools; mild increase in ostomy output.    -  MODERATE (SCALE 4-7): Increase of 4-6 stools daily over normal; moderate increase in ostomy output.  * SEVERE (SCALE 8-10; OR 'WORST POSSIBLE'): Increase of 7 or more stools daily over normal; moderate increase in ostomy output; incontinence.      Yesterday >6 episodes; Today since about 11:30am has gone 4 times (time of call 1:38pm)  2. ONSET: \"When did the diarrhea begin?\"       Early morning yesterday  3. BM CONSISTENCY: \"How loose or watery is the diarrhea?\"       Watery  4. VOMITING: \"Are you also vomiting?\" If Yes, ask: \"How many times in the past 24 hours?\"       Yesterday threw up once.   5. ABDOMINAL PAIN: \"Are you having any abdominal pain?\" If " "Yes, ask: \"What does it feel like?\" (e.g., crampy, dull, intermittent, constant)       Yesterday x2  6. ABDOMINAL PAIN SEVERITY: If present, ask: \"How bad is the pain?\"  (e.g., Scale 1-10; mild, moderate, or severe)    - MILD (1-3): doesn't interfere with normal activities, abdomen soft and not tender to touch     - MODERATE (4-7): interferes with normal activities or awakens from sleep, tender to touch     - SEVERE (8-10): excruciating pain, doubled over, unable to do any normal activities        Mild  7. ORAL INTAKE: If vomiting, \"Have you been able to drink liquids?\" \"How much fluids have you had in the past 24 hours?\"      NA  8. HYDRATION: \"Any signs of dehydration?\" (e.g., dry mouth [not just dry lips], too weak to stand, dizziness, new weight loss) \"When did you last urinate?\"      Chapped lips, fatigue  9. EXPOSURE: \"Have you traveled to a foreign country recently?\" \"Have you been exposed to anyone with diarrhea?\" \"Could you have eaten any food that was spoiled?\"      Yes  10. ANTIBIOTIC USE: \"Are you taking antibiotics now or have you taken antibiotics in the past 2 months?\"        Denies  11. OTHER SYMPTOMS: \"Do you have any other symptoms?\" (e.g., fever, blood in stool)        Mild fever yesterday  12. PREGNANCY: \"Is there any chance you are pregnant?\" \"When was your last menstrual period?\"        11 weeks    Protocols used: Diarrhea-ADULT-[place]    "

## 2024-05-28 ENCOUNTER — ROUTINE PRENATAL (OUTPATIENT)
Dept: PERINATAL CARE | Facility: CLINIC | Age: 24
End: 2024-05-28
Payer: COMMERCIAL

## 2024-05-28 VITALS
HEART RATE: 99 BPM | BODY MASS INDEX: 28.42 KG/M2 | SYSTOLIC BLOOD PRESSURE: 108 MMHG | HEIGHT: 63 IN | DIASTOLIC BLOOD PRESSURE: 80 MMHG | WEIGHT: 160.4 LBS

## 2024-05-28 DIAGNOSIS — O36.80X0 ENCOUNTER TO DETERMINE FETAL VIABILITY OF PREGNANCY, SINGLE OR UNSPECIFIED FETUS: ICD-10-CM

## 2024-05-28 DIAGNOSIS — Z3A.12 12 WEEKS GESTATION OF PREGNANCY: ICD-10-CM

## 2024-05-28 DIAGNOSIS — Z33.1 PREGNANT STATE, INCIDENTAL: ICD-10-CM

## 2024-05-28 DIAGNOSIS — Z34.90 EARLY STAGE OF PREGNANCY: ICD-10-CM

## 2024-05-28 DIAGNOSIS — Z36.82 ENCOUNTER FOR NUCHAL TRANSLUCENCY TESTING: ICD-10-CM

## 2024-05-28 DIAGNOSIS — Z36.9 UNSPECIFIED ANTENATAL SCREENING: ICD-10-CM

## 2024-05-28 DIAGNOSIS — Z36.0 ENCOUNTER FOR ANTENATAL SCREENING FOR CHROMOSOMAL ANOMALIES: Primary | ICD-10-CM

## 2024-05-28 PROBLEM — F17.200 SMOKER: Status: RESOLVED | Noted: 2023-03-27 | Resolved: 2024-05-28

## 2024-05-28 PROCEDURE — 76813 OB US NUCHAL MEAS 1 GEST: CPT | Performed by: OBSTETRICS & GYNECOLOGY

## 2024-05-28 PROCEDURE — 99203 OFFICE O/P NEW LOW 30 MIN: CPT | Performed by: OBSTETRICS & GYNECOLOGY

## 2024-05-28 PROCEDURE — 36415 COLL VENOUS BLD VENIPUNCTURE: CPT | Performed by: OBSTETRICS & GYNECOLOGY

## 2024-05-28 PROCEDURE — 76801 OB US < 14 WKS SINGLE FETUS: CPT | Performed by: OBSTETRICS & GYNECOLOGY

## 2024-05-28 NOTE — PROGRESS NOTES
Patient chose to have [facility] [id number] Non-Invasive Prenatal Screen [id number] PLUS w/ SCA, WITH fetal sex.  Patient choose to be billed through insurance.     Patient given brochure and is aware [facility] will contact patient's insurance and coordinate coverage.  Provided [facility] contact information. General inquiries [phone], Cost estimates [phone] and [facility] Billing [phone]. Website [email or web address].     Blood collection tubes labeled with patient identifiers (name, medical record number, and date of birth).     Filled out [facility] order form. Patient chose to have blood drawn in our office at time of visit. NIPS was drawn from right arm with a straight needle by GI [facility]. .      Maternal Fetal Medicine will have results in approximately 5-7 business days and will call patient or notify via [employer].  Patient aware viewing lab result online will reveal fetal sex if ordered.    Patient verbalized understanding of all instructions and no questions at this time.

## 2024-05-31 ENCOUNTER — ROUTINE PRENATAL (OUTPATIENT)
Dept: OBGYN CLINIC | Facility: CLINIC | Age: 24
End: 2024-05-31
Payer: COMMERCIAL

## 2024-05-31 VITALS
DIASTOLIC BLOOD PRESSURE: 78 MMHG | BODY MASS INDEX: 28.31 KG/M2 | WEIGHT: 159.8 LBS | SYSTOLIC BLOOD PRESSURE: 112 MMHG | HEIGHT: 63 IN

## 2024-05-31 DIAGNOSIS — Z34.81 ENCOUNTER FOR SUPERVISION OF NORMAL PREGNANCY IN MULTIGRAVIDA IN FIRST TRIMESTER: Primary | ICD-10-CM

## 2024-05-31 DIAGNOSIS — Z36.9 ENCOUNTER FOR ANTENATAL SCREENING: ICD-10-CM

## 2024-05-31 PROBLEM — Z3A.12 12 WEEKS GESTATION OF PREGNANCY: Status: RESOLVED | Noted: 2024-05-28 | Resolved: 2024-05-31

## 2024-05-31 LAB
SL AMB  POCT GLUCOSE, UA: NORMAL
SL AMB LEUKOCYTE ESTERASE,UA: NORMAL
SL AMB POCT BILIRUBIN,UA: NORMAL
SL AMB POCT BLOOD,UA: NORMAL
SL AMB POCT CLARITY,UA: CLEAR
SL AMB POCT COLOR,UA: NORMAL
SL AMB POCT KETONES,UA: NORMAL
SL AMB POCT NITRITE,UA: NORMAL
SL AMB POCT PH,UA: 5
SL AMB POCT SPECIFIC GRAVITY,UA: NORMAL
SL AMB POCT URINE PROTEIN: NORMAL
SL AMB POCT UROBILINOGEN: NORMAL

## 2024-05-31 PROCEDURE — 99213 OFFICE O/P EST LOW 20 MIN: CPT | Performed by: PHYSICIAN ASSISTANT

## 2024-05-31 PROCEDURE — 81002 URINALYSIS NONAUTO W/O SCOPE: CPT | Performed by: PHYSICIAN ASSISTANT

## 2024-05-31 PROCEDURE — 87491 CHLMYD TRACH DNA AMP PROBE: CPT | Performed by: PHYSICIAN ASSISTANT

## 2024-05-31 PROCEDURE — G0145 SCR C/V CYTO,THINLAYER,RESCR: HCPCS | Performed by: PHYSICIAN ASSISTANT

## 2024-05-31 PROCEDURE — 87591 N.GONORRHOEAE DNA AMP PROB: CPT | Performed by: PHYSICIAN ASSISTANT

## 2024-05-31 NOTE — PATIENT INSTRUCTIONS
F/u with MFM as planned.    Go for labs as scheduled.    Continue PNV.    Can take Tylenol for HA.  Stay well hydrated, get adequate sleep, and eat frequent, small meals with good protein.  
no...

## 2024-05-31 NOTE — PROGRESS NOTES
"Assessment      Pregnancy 12 and 4/7 weeks     Plan     Problem list reviewed and updated.  Labs reviewed.  Genetic screening tests discussed: ordered.  Role of ultrasound in pregnancy discussed; fetal survey: results reviewed.  Amniocentesis discussed: not indicated.  Follow up in 4 weeks.  .  Pap and GC/chlamydia screening done.  F/u with MFM as planned.  Go for labs as scheduled.  AFP next visit.  Continue PNV.  Can take Tylenol for HA.  Stay well hydrated, get adequate sleep, and eat frequent, small meals with good protein.      Subjective   Ayesha Tomlinson is a 24 y.o. adult being seen today for her obstetrical visit. She is at 12w4d gestation. Patient reports headache, no bleeding, no contractions, no cramping, and no leaking. Fetal movement:  not appreciated yet .  Patient states she is doing well overall.  NT scan with MFM showed appropriate fetal growth; had NIPT drawn.  Will need AFP at 16 wks.  Level II scheduled for July.  Has appointment next week to have PN labs drawn.  Taking PNV.  Complains of frequent HA; has history of migraines.  Taking PNV.  Notes occasional nausea that is improving.  Denies reflux, abdominal pain, and swelling.  Has history of LTCS; would like to try a .    Menstrual History:  OB History          2    Para   1    Term   1            AB        Living   1         SAB        IAB        Ectopic        Multiple        Live Births   1                Menarche age: N/A  Patient's last menstrual period was 2024 (exact date).       The following portions of the patient's history were reviewed and updated as appropriate: allergies, current medications, past family history, past medical history, past social history, past surgical history, and problem list.    Review of Systems  Pertinent items are noted in HPI.     Objective     /78 (BP Location: Left arm, Patient Position: Sitting, Cuff Size: Adult)   Ht 5' 3\" (1.6 m)   Wt 72.5 kg (159 lb 12.8 " oz)   LMP 03/04/2024 (Exact Date)   BMI 28.31 kg/m²   Uterine Size: size equals dates   Pelvic Exam:           Perineum: is normal                 Vulva: normal               Vagina:  normal mucosa                    Cervix: normal                    Uterus: size consistent with 12 weeks              Adnexa: normal adnexa

## 2024-06-01 LAB
CFDNA.FET/CFDNA.TOTAL SFR FETUS: NORMAL %
CITATION REF LAB TEST: NORMAL
FET 13+18+21+X+Y ANEUP PLAS.CFDNA: NEGATIVE
FET CHR 21 TS PLAS.CFDNA QL: NEGATIVE
FET CHR 21 TS PLAS.CFDNA QL: NEGATIVE
FET MS X RISK WBC.DNA+CFDNA QL: NOT DETECTED
FET SEX PLAS.CFDNA DOSAGE CFDNA: NORMAL
FET TS 13 RISK PLAS.CFDNA QL: NEGATIVE
FET X + Y ANEUP RISK PLAS.CFDNA SEQ-IMP: NOT DETECTED
GA EST FROM CONCEPTION DATE: NORMAL D
GESTATIONAL AGE > 9:: YES
LAB DIRECTOR NAME PROVIDER: NORMAL
LAB DIRECTOR NAME PROVIDER: NORMAL
LABORATORY COMMENT REPORT: NORMAL
LIMITATIONS OF THE TEST: NORMAL
NEGATIVE PREDICTIVE VALUE: NORMAL
PERFORMANCE CHARACTERISTICS: NORMAL
POSITIVE PREDICTIVE VALUE: NORMAL
REF LAB TEST METHOD: NORMAL
SL AMB NOTE:: NORMAL
TEST PERFORMANCE INFO SPEC: NORMAL

## 2024-06-03 LAB
C TRACH DNA SPEC QL NAA+PROBE: NEGATIVE
N GONORRHOEA DNA SPEC QL NAA+PROBE: NEGATIVE

## 2024-06-03 NOTE — RESULT ENCOUNTER NOTE
I have reviewed the results of the NIPS which are low risk.  Please call patient and notify her of these reassuring results if she has not viewed on MyChart. Please ensure she is notified of recommendation of MSAFP to be ordered and followed up through her primary Obstetrician's office.      Thank you, James Stubbs MD

## 2024-06-06 ENCOUNTER — NURSE TRIAGE (OUTPATIENT)
Age: 24
End: 2024-06-06

## 2024-06-06 NOTE — TELEPHONE ENCOUNTER
"Patient calling reporting headache that has been on and off for the past 3 days along with slight blurry vision that comes and goes as well.  Patient states she has taken tylenol 45 minutes ago.  Discussed with patient to stay hydrated, and replenishing electrolytes with gatorade zero or other natural juice drinks.  Advised patient that she should attempt to take riboflavin and magensium 400 mg each a day to help with headaches.  Strongly recommended to patient that if she truly is having blurry vision that she needs to proceed to the ER to be further evaluated.  Patient states she called off of work and is requesting a letter to be able to give to her work as she is a newer employee and they are requesting a note.  Will forward to clerical team to further advise.      Reason for Disposition   Headache and has not taken pain medications    Answer Assessment - Initial Assessment Questions  1. LOCATION: \"Where does it hurt?\"       Front of head   2. ONSET: \"When did the headache start?\" (Minutes, hours or days)       2-3 days   3. PATTERN: \"Does the pain come and go, or has it been constant since it started?\"      Comes and goes   4. SEVERITY: \"How bad is the pain?\" and \"What does it keep you from doing?\"     - MILD - doesn't interfere with normal activities     - MODERATE - interferes with normal activities or awakens from sleep     - SEVERE - excruciating pain, unable to do any normal activities         Moderate   5. RECURRENT SYMPTOM: \"Have you ever had headaches before?\" If Yes, ask: \"When was the last time?\" and \"What happened that time?\"       History of headaches   6. CAUSE: \"What do you think is causing the headache?\"      Unknown   7. MIGRAINE: \"Have you been diagnosed with migraine headaches?\" If Yes, ask: \"Is this headache similar?\"       Has history of headaches in the past   8. HEAD INJURY: \"Has there been any recent injury to the head?\"       Denies   9. OTHER SYMPTOMS: \"Do you have any other symptoms?\" " "(e.g., fever, stiff neck, blurred vision; swelling of hands, face, or feet)      Blurry vision   10. PREGNANCY: \"How many weeks pregnant are you?\"        13w3d  11. TERESA: \"What date are you expecting to deliver?\"        12/9/24    Protocols used: Pregnancy - Headache-ADULT-[place]    "

## 2024-06-07 NOTE — TELEPHONE ENCOUNTER
Patient calling back. States feels better. Took meds along with a very long nap, and woke up with headache improving. RN advised that if headache comes on again and becomes severe, persistent and with blurry vision - go to ED for evaluation. Advised on staying hydrated and chewing on ice chips to help. Pt verbalized an understanding. Pt asking to follow up on note for work to excuse her yesterday. RN advised aware that office is working on it, but will send a message to follow up. Pt agreeable to plan. No further questions.

## 2024-06-10 LAB
LAB AP GYN PRIMARY INTERPRETATION: NORMAL
LAB AP LMP: NORMAL
Lab: NORMAL
PATH INTERP SPEC-IMP: NORMAL

## 2024-06-10 NOTE — TELEPHONE ENCOUNTER
Letter sent to pt via [employer]. [employer] message sent to pt to make aware.   <--- Click to Launch [employer]x for PreOp, PostOp and Procedure

## 2024-06-17 ENCOUNTER — TELEPHONE (OUTPATIENT)
Dept: OBGYN CLINIC | Facility: CLINIC | Age: 24
End: 2024-06-17

## 2024-06-17 DIAGNOSIS — N76.0 BV (BACTERIAL VAGINOSIS): Primary | ICD-10-CM

## 2024-06-17 DIAGNOSIS — B96.89 BV (BACTERIAL VAGINOSIS): Primary | ICD-10-CM

## 2024-06-17 RX ORDER — METRONIDAZOLE 500 MG/1
500 TABLET ORAL EVERY 12 HOURS SCHEDULED
Qty: 14 TABLET | Refills: 0 | Status: SHIPPED | OUTPATIENT
Start: 2024-06-17 | End: 2024-06-25

## 2024-06-17 NOTE — TELEPHONE ENCOUNTER
Spoke with patient regarding Pap results - cytology negative, but showed presence of BV.  Rx for metronidazole 500 mg BID x 7 days sent to pharmacy. Call with problems.

## 2024-06-25 ENCOUNTER — ROUTINE PRENATAL (OUTPATIENT)
Dept: OBGYN CLINIC | Facility: CLINIC | Age: 24
End: 2024-06-25
Payer: COMMERCIAL

## 2024-06-25 VITALS
BODY MASS INDEX: 28.84 KG/M2 | DIASTOLIC BLOOD PRESSURE: 66 MMHG | SYSTOLIC BLOOD PRESSURE: 104 MMHG | WEIGHT: 162.8 LBS | HEIGHT: 63 IN

## 2024-06-25 DIAGNOSIS — Z34.92 SECOND TRIMESTER PREGNANCY: Primary | ICD-10-CM

## 2024-06-25 PROCEDURE — 99212 OFFICE O/P EST SF 10 MIN: CPT | Performed by: PHYSICIAN ASSISTANT

## 2024-06-25 NOTE — PROGRESS NOTES
OB/GYN  PN Visit  Ayesha Tomlinson  [id number]  2024  2:09 PM  Margy Dent PA-C    S: 24 y.o.  16w0d here for PN visit. Pregnancy complicated by h/o depression, h/o LTCS.     OB complaints:  Denies c/o n/v/ha, no edema, no smoking, no DV.   No vb/lof  No cramping/ctxns or signs of PTL.    She reports that she is having some low back and hip pain in this pregnancy. Denies vb or cramping.   We reviewed pelvic floor PT as an option which she will consider.     O:    Pre- Vitals    Flowsheet Row Most Recent Value   Prenatal Assessment    Fetal Heart Rate 150   Fundal Height (cm) 16 cm   Movement Present   Prenatal Vitals    Blood Pressure 104/66   Weight - Scale 73.8 kg (162 lb 12.8 oz)   Urine Albumin/Glucose    Dilation/Effacement/Station    Vaginal Drainage    Draining Fluid No   Edema    LLE Edema None   RLE Edema None   Facial Edema None            Gen: no acute distress, nonlabored breathing.  OB exam completed: fundal height, +FHT.  Urine: -/-    A/P:  #1. 16w0d GESTATION  AFP ordered      RTC in 4 weeks    Margy Dent PA-C  2024  2:09 PM

## 2024-07-18 NOTE — PROGRESS NOTES
OB/GYN  PN Visit  Ayesha Tomlinson  [id number]  2024  8:40 AM  RAYSHAWN Soria    S: 24 y.o.  20w0d here for PN visit. Pregnancy complicated by h/o depression, h/o LTCS.     OB complaints:  Denies c/o n/v/ha, no edema, no smoking, no DV.   No vb/lof  No cramping/ctxns or signs of PTL.    She feels good overall, +FM    O:    Pre-Sabino Vitals      Flowsheet Row Most Recent Value   Prenatal Assessment    Fetal Heart Rate 150   Fundal Height (cm) 20 cm   Movement Present   Prenatal Vitals    Blood Pressure 104/80   Weight - Scale 75.5 kg (166 lb 6.4 oz)   Urine Albumin/Glucose    Dilation/Effacement/Station    Vaginal Drainage    Edema                 Gen: no acute distress, nonlabored breathing.  OB exam completed: fundal height, +FHT.  Urine: -/-    A/P:  #1. 20w0d GESTATION  PTL precautions.        RTC in 4 weeks    RAYSHAWN Soria  2024  8:40 AM

## 2024-07-22 ENCOUNTER — ROUTINE PRENATAL (OUTPATIENT)
Dept: OBGYN CLINIC | Facility: CLINIC | Age: 24
End: 2024-07-22
Payer: COMMERCIAL

## 2024-07-22 VITALS
HEIGHT: 63 IN | WEIGHT: 166.4 LBS | SYSTOLIC BLOOD PRESSURE: 104 MMHG | DIASTOLIC BLOOD PRESSURE: 80 MMHG | BODY MASS INDEX: 29.48 KG/M2

## 2024-07-22 DIAGNOSIS — Z34.92 PRENATAL CARE IN SECOND TRIMESTER: Primary | ICD-10-CM

## 2024-07-22 PROCEDURE — 99213 OFFICE O/P EST LOW 20 MIN: CPT

## 2024-07-23 ENCOUNTER — ROUTINE PRENATAL (OUTPATIENT)
Dept: PERINATAL CARE | Facility: CLINIC | Age: 24
End: 2024-07-23
Payer: COMMERCIAL

## 2024-07-23 VITALS
DIASTOLIC BLOOD PRESSURE: 68 MMHG | HEIGHT: 63 IN | BODY MASS INDEX: 29.77 KG/M2 | OXYGEN SATURATION: 99 % | WEIGHT: 168 LBS | SYSTOLIC BLOOD PRESSURE: 116 MMHG | HEART RATE: 99 BPM

## 2024-07-23 DIAGNOSIS — Z36.89 ENCOUNTER FOR FETAL ANATOMIC SURVEY: ICD-10-CM

## 2024-07-23 DIAGNOSIS — Z3A.20 20 WEEKS GESTATION OF PREGNANCY: Primary | ICD-10-CM

## 2024-07-23 DIAGNOSIS — Z36.86 ENCOUNTER FOR ANTENATAL SCREENING FOR CERVICAL LENGTH: ICD-10-CM

## 2024-07-23 PROBLEM — O34.219 HISTORY OF CESAREAN DELIVERY, ANTEPARTUM: Status: ACTIVE | Noted: 2024-07-23

## 2024-07-23 PROCEDURE — 76805 OB US >/= 14 WKS SNGL FETUS: CPT | Performed by: OBSTETRICS & GYNECOLOGY

## 2024-07-23 PROCEDURE — 99213 OFFICE O/P EST LOW 20 MIN: CPT | Performed by: OBSTETRICS & GYNECOLOGY

## 2024-07-23 PROCEDURE — 76817 TRANSVAGINAL US OBSTETRIC: CPT | Performed by: OBSTETRICS & GYNECOLOGY

## 2024-07-23 NOTE — LETTER
"2024     RAYSHAWN Soria  [address] [address]    Patient: Ayesha Tomlinson   YOB: 2000   Date of Visit: 2024       Dear RAYSHAWN Limon:    Thank you for referring Ayesha Tomlinson to me for evaluation. Below are my notes for this consultation.    If you have questions, please do not hesitate to call me. I look forward to following your patient along with you.         Sincerely,        Lucero Mahoney MD        CC: No Recipients    Lucero Mahoney MD  2024  8:53 AM  Sign when Signing Visit  [facility]: Ayesha Tomlinson was seen today at 20w1d for anatomic survey and cervical length screening ultrasound.  See ultrasound report under \"OB Procedures\" tab.  Please don't hesitate to contact our office with any concerns or questions.  -Lucero Mahoney MD         "

## 2024-07-23 NOTE — PROGRESS NOTES
"[facility]: Ayesha Tomlinson was seen today at 20w1d for anatomic survey and cervical length screening ultrasound.  See ultrasound report under \"OB Procedures\" tab.  Please don't hesitate to contact our office with any concerns or questions.  -Lucero Mahoney MD      "

## 2024-07-23 NOTE — PROGRESS NOTES
Ultrasound Probe Disinfection    A transvaginal ultrasound was performed.   Prior to use, disinfection was performed with High Level Disinfection Process ([employer]).  Probe serial number [id number]: [id number] was used.      Caron Guevara  07/23/24  8:04 AM

## 2024-08-15 NOTE — PROGRESS NOTES
OB/GYN  PN Visit  Ayesha Tomlinson  [id number]  2024  8:15 AM  RAYSHAWN Soria    S: 24 y.o.  24w1d here for PN visit. Pregnancy complicated by h/o depression, h/o LTCS.     OB complaints:  Denies c/o n/v, no edema, no DV.   No vb/lof  No cramping/ctxns or signs of PTL.    She feels good overall, +FM. She reports occasional headaches, relieved with tylenol. +smoking    O:    Pre-Sabino Vitals      Flowsheet Row Most Recent Value   Prenatal Assessment    Fetal Heart Rate 145   Fundal Height (cm) 25 cm   Movement Present   Prenatal Vitals    Blood Pressure 122/80   Weight - Scale 78.5 kg (173 lb)   Urine Albumin/Glucose    Dilation/Effacement/Station    Vaginal Drainage    Edema                   Gen: no acute distress, nonlabored breathing.  OB exam completed: fundal height, +FHT.  Urine: -/-    A/P:  #1. 24w0d GESTATION  PTL precautions.  Advised to complete prenatal blood work. 1 hr gtt ordered today but needs to complete initial prenatal. Reviewed importance of tracking glycemic control and anemia in pregnancy to decrease risk to baby and mother. Risks include but not limited to potential delivery complications (I.e., hemorrhage, shoulder dystocia), gestational diabetes, macrosomia,  hypoglycemia (requiring heel sticks postdelivery and increased prevalence of NICU admission) and/or /fetal demise.     Contraception: Liletta IUD in PP   Breastfeeding: yes, pump ordered today.            RTC in 4 weeks    RAYSHAWN Soria  2024  8:15 AM

## 2024-08-20 ENCOUNTER — ROUTINE PRENATAL (OUTPATIENT)
Dept: OBGYN CLINIC | Facility: CLINIC | Age: 24
End: 2024-08-20
Payer: COMMERCIAL

## 2024-08-20 VITALS
HEIGHT: 63 IN | BODY MASS INDEX: 30.65 KG/M2 | SYSTOLIC BLOOD PRESSURE: 122 MMHG | WEIGHT: 173 LBS | DIASTOLIC BLOOD PRESSURE: 80 MMHG

## 2024-08-20 DIAGNOSIS — Z34.92 PRENATAL CARE IN SECOND TRIMESTER: Primary | ICD-10-CM

## 2024-08-20 LAB
DME PARACHUTE DELIVERY DATE REQUESTED: NORMAL
DME PARACHUTE ITEM DESCRIPTION: NORMAL
DME PARACHUTE ORDER STATUS: NORMAL
DME PARACHUTE SUPPLIER NAME: NORMAL
DME PARACHUTE SUPPLIER PHONE: NORMAL

## 2024-08-20 PROCEDURE — 99213 OFFICE O/P EST LOW 20 MIN: CPT

## 2024-09-03 ENCOUNTER — HOSPITAL ENCOUNTER (OUTPATIENT)
Facility: HOSPITAL | Age: 24
Discharge: HOME | End: 2024-09-03
Attending: OBSTETRICS & GYNECOLOGY | Admitting: OBSTETRICS & GYNECOLOGY
Payer: COMMERCIAL

## 2024-09-03 PROBLEM — Z3A.26 PREGNANCY WITH 26 COMPLETED WEEKS GESTATION: Status: ACTIVE | Noted: 2024-09-03

## 2024-09-03 PROCEDURE — 99213 OFFICE O/P EST LOW 20 MIN: CPT

## 2024-09-03 PROCEDURE — 99213 OFFICE O/P EST LOW 20 MIN: CPT | Performed by: OBSTETRICS & GYNECOLOGY

## 2024-09-04 NOTE — PROGRESS NOTES
Triage Note - OB  Ayesha Tomlinson 24 y.o. adult MRN: [id number]  Unit/Bed#: LD TRIAGE 4 Encounter: [id number]    Chief Complaint: bleeding not sure if from vagina or rectal area TERESA: Estimated Date of Delivery: 24    HPI: Patient is a  at 26w1d here complaining of bleeding after wiping after firm bowel movement.  Patient not sure if vagina or rectal.  Good fm, no leaking  of fluid, no contractions, no pain.  No further bleeding, no dyuria.    Vitals: @VS  There is no height or weight on file to calculate BMI.    Physical Exam  GEN:   SVE: cl/th/high    FHR: 140's moderate variability, no deceleration and good accelerations  [place]: none  Speculum: no blood, no infection, normal white discharge, closed cervix    Labs: No results found for this or any previous visit (from the past 24 hour(s)).      Lab, Imaging and other studies: I have personally reviewed pertinent reports.    A/P: 23 y/o  here for bleeding after firm bm.  No active bleeding, likely from constipation  1) reviewed miralax, colace, senna for constipation  2) Discharge instructions given to patient and labor precautions reviewed.    Sarina Mckeon MD  9/3/2024  9:53 PM

## 2024-09-12 ENCOUNTER — APPOINTMENT (OUTPATIENT)
Dept: LAB | Facility: CLINIC | Age: 24
End: 2024-09-12
Payer: COMMERCIAL

## 2024-09-12 DIAGNOSIS — Z34.92 SECOND TRIMESTER PREGNANCY: ICD-10-CM

## 2024-09-12 LAB
25(OH)D3 SERPL-MCNC: 23.1 NG/ML (ref 30–100)
ABO GROUP BLD: NORMAL
AMORPH URATE CRY URNS QL MICRO: ABNORMAL
BACTERIA UR QL AUTO: ABNORMAL /HPF
BASOPHILS # BLD AUTO: 0.04 THOUSANDS/ΜL (ref 0–0.1)
BASOPHILS NFR BLD AUTO: 0 % (ref 0–1)
BILIRUB UR QL STRIP: NEGATIVE
BLD GP AB SCN SERPL QL: NEGATIVE
CLARITY UR: ABNORMAL
COLOR UR: YELLOW
EOSINOPHIL # BLD AUTO: 0.15 THOUSAND/ΜL (ref 0–0.61)
EOSINOPHIL NFR BLD AUTO: 1 % (ref 0–6)
ERYTHROCYTE [DISTWIDTH] IN BLOOD BY AUTOMATED COUNT: 13.2 % (ref 11.6–15.1)
GLUCOSE UR STRIP-MCNC: NEGATIVE MG/DL
HBV SURFACE AG SER QL: NORMAL
HCT VFR BLD AUTO: 35.3 % (ref 36.5–46.1)
HCV AB SER QL: NORMAL
HGB BLD-MCNC: 12.3 G/DL (ref 12–15.4)
HGB UR QL STRIP.AUTO: NEGATIVE
HIV 1+2 AB+HIV1 P24 AG SERPL QL IA: NORMAL
HIV 2 AB SERPL QL IA: NORMAL
HIV1 AB SERPL QL IA: NORMAL
HIV1 P24 AG SERPL QL IA: NORMAL
IMM GRANULOCYTES # BLD AUTO: 0.05 THOUSAND/UL (ref 0–0.2)
IMM GRANULOCYTES NFR BLD AUTO: 0 % (ref 0–2)
KETONES UR STRIP-MCNC: NEGATIVE MG/DL
LEUKOCYTE ESTERASE UR QL STRIP: NEGATIVE
LYMPHOCYTES # BLD AUTO: 2.85 THOUSANDS/ΜL (ref 0.6–4.47)
LYMPHOCYTES NFR BLD AUTO: 23 % (ref 14–44)
MCH RBC QN AUTO: 31.9 PG (ref 26.8–34.3)
MCHC RBC AUTO-ENTMCNC: 34.8 G/DL (ref 31.4–37.4)
MCV RBC AUTO: 92 FL (ref 82–98)
MONOCYTES # BLD AUTO: 0.97 THOUSAND/ΜL (ref 0.17–1.22)
MONOCYTES NFR BLD AUTO: 8 % (ref 4–12)
MUCOUS THREADS UR QL AUTO: ABNORMAL
NEUTROPHILS # BLD AUTO: 8.42 THOUSANDS/ΜL (ref 1.85–7.62)
NEUTS SEG NFR BLD AUTO: 68 % (ref 43–75)
NITRITE UR QL STRIP: NEGATIVE
NON-SQ EPI CELLS URNS QL MICRO: ABNORMAL /HPF
NRBC BLD AUTO-RTO: 0 /100 WBCS
PH UR STRIP.AUTO: 6.5 [PH]
PLATELET # BLD AUTO: 256 THOUSANDS/UL (ref 149–390)
PMV BLD AUTO: 10.8 FL (ref 8.9–12.7)
PROT UR STRIP-MCNC: ABNORMAL MG/DL
RBC # BLD AUTO: 3.86 MILLION/UL (ref 3.88–5.12)
RBC #/AREA URNS AUTO: ABNORMAL /HPF
RH BLD: POSITIVE
RUBV IGG SERPL IA-ACNC: 165.8 IU/ML
SP GR UR STRIP.AUTO: 1.01 (ref 1–1.03)
SPECIMEN EXPIRATION DATE: NORMAL
TREPONEMA PALLIDUM IGG+IGM AB [PRESENCE] IN SERUM OR PLASMA BY IMMUNOASSAY: NORMAL
UROBILINOGEN UR STRIP-ACNC: <2 MG/DL
WBC # BLD AUTO: 12.48 THOUSAND/UL (ref 4.31–10.16)
WBC #/AREA URNS AUTO: ABNORMAL /HPF

## 2024-09-12 PROCEDURE — 86900 BLOOD TYPING SEROLOGIC ABO: CPT

## 2024-09-12 PROCEDURE — 87389 HIV-1 AG W/HIV-1&-2 AB AG IA: CPT

## 2024-09-12 PROCEDURE — 82306 VITAMIN D 25 HYDROXY: CPT

## 2024-09-12 PROCEDURE — 81001 URINALYSIS AUTO W/SCOPE: CPT

## 2024-09-12 PROCEDURE — 86850 RBC ANTIBODY SCREEN: CPT

## 2024-09-12 PROCEDURE — 87340 HEPATITIS B SURFACE AG IA: CPT

## 2024-09-12 PROCEDURE — 87086 URINE CULTURE/COLONY COUNT: CPT

## 2024-09-12 PROCEDURE — 36415 COLL VENOUS BLD VENIPUNCTURE: CPT

## 2024-09-12 PROCEDURE — 82105 ALPHA-FETOPROTEIN SERUM: CPT

## 2024-09-12 PROCEDURE — 86780 TREPONEMA PALLIDUM: CPT

## 2024-09-12 PROCEDURE — 86803 HEPATITIS C AB TEST: CPT

## 2024-09-12 PROCEDURE — 86901 BLOOD TYPING SEROLOGIC RH(D): CPT

## 2024-09-12 PROCEDURE — 85025 COMPLETE CBC W/AUTO DIFF WBC: CPT

## 2024-09-12 PROCEDURE — 86762 RUBELLA ANTIBODY: CPT

## 2024-09-14 LAB — BACTERIA UR CULT: NORMAL

## 2024-09-17 ENCOUNTER — HOSPITAL ENCOUNTER (EMERGENCY)
Facility: HOSPITAL | Age: 24
Discharge: HOME | End: 2024-09-17
Attending: EMERGENCY MEDICINE
Payer: COMMERCIAL

## 2024-09-17 VITALS
HEART RATE: 100 BPM | RESPIRATION RATE: 16 BRPM | TEMPERATURE: 98.4 F | DIASTOLIC BLOOD PRESSURE: 56 MMHG | OXYGEN SATURATION: 100 % | SYSTOLIC BLOOD PRESSURE: 121 MMHG

## 2024-09-17 DIAGNOSIS — S46.002A INJURY OF LEFT ROTATOR CUFF, INITIAL ENCOUNTER: Primary | ICD-10-CM

## 2024-09-17 PROCEDURE — 99284 EMERGENCY DEPT VISIT MOD MDM: CPT | Performed by: EMERGENCY MEDICINE

## 2024-09-17 PROCEDURE — 99283 EMERGENCY DEPT VISIT LOW MDM: CPT

## 2024-09-17 RX ORDER — ACETAMINOPHEN 325 MG/1
975 TABLET ORAL ONCE
Status: COMPLETED | OUTPATIENT
Start: 2024-09-17 | End: 2024-09-17

## 2024-09-17 RX ADMIN — ACETAMINOPHEN 975 MG: 325 TABLET ORAL at 10:13

## 2024-09-17 NOTE — Clinical Note
Ayesha Tomlinson was seen and treated in our emergency department on 9/17/2024.        Other - See Comments    Light duty with her left arm until cleared by orthopedic surgery    Diagnosis:     Ayesha  .    Ayesha may return on this date:          If you have any questions or concerns, please don't hesitate to call.      James Holly MD    ______________________________           _______________          _______________  Hospital Representative                              Date                                Time

## 2024-09-17 NOTE — ED PROVIDER NOTES
1. Injury of left rotator cuff, initial encounter      ED Disposition       ED Disposition   Discharge    Condition   Stable    Date/Time   Tue Sep 17, 2024  9:46 AM    Comment   Ayesha Tomlinson discharge to home/self care.                   Assessment & Plan       Medical Decision Making  24-year-old female presents with left shoulder pain, no abnormalities still to the left shoulder, will be treated for a rotator cuff injury based upon her full exam, will be placed in a sling, will be urged to use Tylenol due to her being pregnant and the limited options for pain relief, and instructed to follow-up with orthopedic surgery for outpatient management to include physical therapy and further care.  The patient is comfortable treatment plan and is safe for discharge home.    Risk  OTC drugs.      Medications   acetaminophen (TYLENOL) tablet 975 mg (975 mg Oral Given 9/17/24 1013)       History of Present Illness       Patient presents with 1 day of pain in her left shoulder, reports she did not have any trauma injury, the pain was present when she woke up, she is not having difficulty raising her left.  The patient has not had any falls, no fever, numbness and tingling in her hand, weakness in her  strength, no swelling or discoloration in the shoulder.  The patient reports she does sometimes sleep on her left side, which she is currently pregnant, the patient reports she does not drink, smoke, use drugs.          Review of Systems   Constitutional:  Negative for fever.   HENT:  Negative for congestion.    Eyes:  Negative for visual disturbance.   Respiratory:  Negative for cough and shortness of breath.    Cardiovascular:  Negative for chest pain.   Gastrointestinal:  Negative for abdominal pain, diarrhea and vomiting.   Endocrine: Negative for polyuria.   Genitourinary:  Negative for dysuria and hematuria.   Musculoskeletal:  Positive for arthralgias and myalgias.   Neurological:  Negative for  light-headedness and headaches.           Objective     ED Triage Vitals [09/17/24 0912]   Temperature Pulse Blood Pressure Respirations SpO2 Patient Position - Orthostatic VS   98.4 °F (36.9 °C) 100 121/56 16 100 % Sitting      Temp Source Heart Rate Source BP Location FiO2 (%) Pain Score    Oral Monitor Right arm -- 6        Physical Exam  Vitals and nursing note reviewed.   Constitutional:       General: Ayesha is not in acute distress.     Appearance: Normal appearance. Ayesha is well-developed.   HENT:      Head: Normocephalic and atraumatic.   Eyes:      Extraocular Movements: Extraocular movements intact.      Conjunctiva/sclera: Conjunctivae normal.   Cardiovascular:      Rate and Rhythm: Normal rate.   Pulmonary:      Effort: Pulmonary effort is normal. No respiratory distress.   Abdominal:      General: There is no distension.   Musculoskeletal:         General: No swelling.      Cervical back: Normal range of motion.      Comments: Tenderness to palpation glenohumeral joint, with pain with extra rotation of the left shoulder joint, and a positive drop arm test, Apley scratch test positive,  strength and sensation are intact and equal in both hands.  Other evocative maneuvers of the left hand are unremarkable.   Skin:     General: Skin is warm and dry.      Capillary Refill: Capillary refill takes less than 2 seconds.   Neurological:      General: No focal deficit present.      Mental Status: Ayesha is alert and oriented to person, place, and time.   Psychiatric:         Mood and Affect: Mood normal.         Labs Reviewed - No data to display  No orders to display       Procedures       James Holly MD  09/22/24 [id number]

## 2024-09-18 ENCOUNTER — PATIENT MESSAGE (OUTPATIENT)
Dept: OBGYN CLINIC | Facility: CLINIC | Age: 24
End: 2024-09-18

## 2024-09-18 LAB
2ND TRIMESTER 4 SCREEN SERPL-IMP: NORMAL
AFP ADJ MOM SERPL: NORMAL
AFP INTERP AMN-IMP: NORMAL
AFP INTERP SERPL-IMP: NORMAL
AFP INTERP SERPL-IMP: NORMAL
AFP SERPL-MCNC: 145.7 NG/ML
AGE AT DELIVERY: 26.5 YR
GA METHOD: NORMAL
GA: 27.4 WEEKS
IDDM PATIENT QL: NO
MULTIPLE PREGNANCY: NO
NEURAL TUBE DEFECT RISK FETUS: NORMAL %

## 2024-09-18 NOTE — PROGRESS NOTES
OB/GYN  PN Visit  Ayesha Tomlinson  [id number]  2024  8:12 AM  Margy Dent PA-C    S: 24 y.o.  28w4d here for PN visit. Pregnancy complicated by depression and h/o LTCS.     OB complaints:  Denies c/o n/v/ha, no edema, no smoking, no DV.   No vb/lof  No cramping/ctxns or signs of PTL.    She reports that she is feeling well.      O:    Pre- Vitals      Flowsheet Row Most Recent Value   Prenatal Assessment    Fetal Heart Rate 142   Fundal Height (cm) 29 cm   Movement Present   Prenatal Vitals    Blood Pressure 120/76   Weight - Scale 82.6 kg (182 lb)   Urine Albumin/Glucose    Dilation/Effacement/Station    Vaginal Drainage    Draining Fluid No   Edema    LLE Edema None   RLE Edema None   Facial Edema None              Gen: no acute distress, nonlabored breathing.  OB exam completed: fundal height, +FHT.  Urine: -/-    A/P:  #1. 28w4d GESTATION  Labor precautions reviewed  Fetal kick counts reviewed  Tdap: desires  Rhogam: NA O+  Breast pump: ordered previously  Contraception: plans IUD and considering DPMA bridge  Plans glucose in the next week, she notes the lab was closing when she had her last labs drawn. Labs reviewed, will add vitamin D supplement.    RTC in 2 weeks    Margy Dent PA-C  2024  8:12 AM

## 2024-09-20 ENCOUNTER — PATIENT MESSAGE (OUTPATIENT)
Dept: OBGYN CLINIC | Facility: CLINIC | Age: 24
End: 2024-09-20

## 2024-09-20 ENCOUNTER — ROUTINE PRENATAL (OUTPATIENT)
Dept: OBGYN CLINIC | Facility: CLINIC | Age: 24
End: 2024-09-20
Payer: COMMERCIAL

## 2024-09-20 VITALS
DIASTOLIC BLOOD PRESSURE: 76 MMHG | SYSTOLIC BLOOD PRESSURE: 120 MMHG | BODY MASS INDEX: 32.25 KG/M2 | WEIGHT: 182 LBS | HEIGHT: 63 IN

## 2024-09-20 DIAGNOSIS — Z34.83 MULTIGRAVIDA IN THIRD TRIMESTER: Primary | ICD-10-CM

## 2024-09-20 DIAGNOSIS — Z23 ENCOUNTER FOR IMMUNIZATION: ICD-10-CM

## 2024-09-20 PROCEDURE — 99212 OFFICE O/P EST SF 10 MIN: CPT | Performed by: PHYSICIAN ASSISTANT

## 2024-09-20 PROCEDURE — 90471 IMMUNIZATION ADMIN: CPT | Performed by: PHYSICIAN ASSISTANT

## 2024-09-20 PROCEDURE — 90715 TDAP VACCINE 7 YRS/> IM: CPT | Performed by: PHYSICIAN ASSISTANT

## 2024-09-23 ENCOUNTER — OFFICE VISIT (OUTPATIENT)
Dept: OBGYN CLINIC | Facility: CLINIC | Age: 24
End: 2024-09-23
Payer: COMMERCIAL

## 2024-09-23 VITALS
WEIGHT: 180 LBS | SYSTOLIC BLOOD PRESSURE: 110 MMHG | DIASTOLIC BLOOD PRESSURE: 79 MMHG | HEART RATE: 121 BPM | BODY MASS INDEX: 31.89 KG/M2 | HEIGHT: 63 IN

## 2024-09-23 DIAGNOSIS — M75.02 ADHESIVE CAPSULITIS OF LEFT SHOULDER: Primary | ICD-10-CM

## 2024-09-23 DIAGNOSIS — S46.002A INJURY OF LEFT ROTATOR CUFF, INITIAL ENCOUNTER: ICD-10-CM

## 2024-09-23 PROCEDURE — 99204 OFFICE O/P NEW MOD 45 MIN: CPT | Performed by: ORTHOPAEDIC SURGERY

## 2024-09-23 NOTE — PROGRESS NOTES
CHIEF COMPLAIN/REASON FOR VISIT  Chief Complaint   Patient presents with    Left Shoulder - Pain       HISTORY OF PRESENT ILLNESS  Ayesha Tomlinson is a RHD 24 y.o. adult who presents for evaluation of their left shoulder.  Patient said a week ago she woke up and had pain and stiffness in the left shoulder.  She denies any obvious trauma or injury preceding the symptoms.  She does not want an x-ray today due to being pregnant.  Patient does mention that she smokes.    REVIEW OF SYSTEMS  Review of systems was performed and, woutside that mentioned in the HPI, it was negative for symptomology related to the integumentary, hematologic, immunologic, allergic, neurologic, cardiovascular, respiratory, GI or  systems.     MEDICAL HISTORY  Patient Active Problem List   Diagnosis    Lactose intolerance    20 weeks gestation of pregnancy    History of  delivery, antepartum    Pregnancy with 26 completed weeks gestation       SURGICAL HISTORY  Past Surgical History:   Procedure Laterality Date     SECTION  2017    CHOLECYSTECTOMY  2017       CURRENT MEDICATIONS    Current Outpatient Medications:     Prenatal Vit-Fe Fumarate-FA (PRENATAL VITAMINS PO), Take 2 tablets by mouth gummies, Disp: , Rfl:     SOCIAL HISTORY  Social History     Socioeconomic History    Marital status: Single     Spouse name: Not on file    Number of children: Not on file    Years of education: Not on file    Highest education level: Not on file   Occupational History    Not on file   Tobacco Use    Smoking status: Former     Current packs/day: 0.50     Types: Cigarettes    Smokeless tobacco: Never   Vaping Use    Vaping status: Never Used   Substance and Sexual Activity    Alcohol use: Not Currently     Alcohol/week: 6.0 - 8.0 standard drinks of alcohol     Types: 2 Glasses of wine, 4 - 6 Shots of liquor per week     Comment: not currrently due to preg    Drug use: Not Currently     Types: Marijuana     Comment: rare     "Sexual activity: Yes     Partners: Female, Male   Other Topics Concern    Not on file   Social History Narrative    Not on file     Social Determinants of Health     Financial Resource Strain: Not on file   Food Insecurity: Food Insecurity Present (5/30/2024)    Hunger Vital Sign     Worried About Running Out of Food in the Last Year: Sometimes true     Ran Out of Food in the Last Year: Patient declined   Transportation Needs: Unmet Transportation Needs (5/30/2024)    PRAPARE - Transportation     Lack of Transportation (Medical): No     Lack of Transportation (Non-Medical): Yes   Physical Activity: Not on file   Stress: Not on file   Social Connections: Not on file   Intimate Partner Violence: Not on file   Housing Stability: Low Risk  (5/30/2024)    Housing Stability Vital Sign     Unable to Pay for Housing in the Last Year: No     Number of Times Moved in the Last Year: 0     Homeless in the Last Year: No       Objective     VITAL SIGNS  /79   Pulse (!) 121   Ht 5' 3\" (1.6 m)   Wt 81.6 kg (180 lb)   LMP 03/04/2024 (Exact Date)   BMI 31.89 kg/m²      PHYSICAL EXAM  General:   Well-appearing  No acute distress  Appears stated age    Left Shoulder  Shoulder effusion none present  Shoulder abduction 120 / 120  Shoulder forward flexion 120 / 120  Shoulder external rotation 50 / 90  Shoulder internal rotation back pocket  Supraspinatus/ABD 5/5   Infraspinatus/ER 5/5   Positive O'briens  Skin is intact with no erythema, warmth or drainage  Motor strength intact distally  Sensation to light touch is normal in the axillary, radial, ulnar, and median nerve distributions.  Fingers warm and perfused    RADIOGRAPHIC EXAMINATION/DIAGNOSTICS:  None, shoulder x-ray declined due to pregnancy    ASSESSMENT/PLAN:  Left shoulder pain; adhesive capsulitis    Given patient history and exam findings, patient is likely dealing with adhesive capsulitis (frozen shoulder) at this time. Explained to the patient the pathophysiology " of frozen shoulder in layman's terms for the patient. It was discussed that it is typically caused by an inflammatory process in the shoulder which leads to fibroblastic proliferation of the joint capsule leading to thickening, fibrosis, and adherence of the capsule to itself in the humerus.  Educated the patient that this can be associated with certain other medical conditions including but not limited to diabetes and hypothyroidism.  Discussed with the patient the typical findings of frozen shoulder including insidious loss of both active and passive range of motion.  At this time, initial recommendations are to pursue conservative therapy including anti-inflammatories, physical therapy, heat/ice, and/or steroid injections.  Advised that surgery is not necessary at this time as the conservative options are often effective at treating frozen shoulder. Smoking cessation recommended. It was discussed with the patient that should conservative measures fail that further work-up could be done with MRI and/or surgical options.  After discussion, patient is agreeable to pursuing conservative therapy.  Patient was advised to follow-up with us prior to her next appointment should symptoms worsen or change.     Scribe Attestation      I,:  Brock Sampson PA-C am acting as a scribe while in the presence of the attending physician.:       I,:  Nacho Andres MD personally performed the services described in this documentation    as scribed in my presence.:

## 2024-09-29 PROBLEM — Z3A.30 30 WEEKS GESTATION OF PREGNANCY: Status: ACTIVE | Noted: 2024-07-23

## 2024-09-29 NOTE — ASSESSMENT & PLAN NOTE
First tri and third tri labs done together, missing 1hr GTT  Rh status POS  Growth scheduled 10/15/24  TDAP: received  Birth Plan: TOLAC, Reviewed risks with TOLAC including need for repeat CS, risk of uterine rupture (<1% with LT scar) and risks of scheduled repeat CS including bleeding, infection, etc  Feeding: breast, ordered pump  Peds: [facility]  Contraception: open to more kids; nothing for now  Discussed pre-term labor precautions  Return to office in 2 weeks

## 2024-10-01 ENCOUNTER — ROUTINE PRENATAL (OUTPATIENT)
Dept: OBGYN CLINIC | Facility: CLINIC | Age: 24
End: 2024-10-01
Payer: COMMERCIAL

## 2024-10-01 VITALS — SYSTOLIC BLOOD PRESSURE: 118 MMHG | DIASTOLIC BLOOD PRESSURE: 78 MMHG | BODY MASS INDEX: 33.13 KG/M2 | WEIGHT: 187 LBS

## 2024-10-01 DIAGNOSIS — Z3A.30 30 WEEKS GESTATION OF PREGNANCY: Primary | ICD-10-CM

## 2024-10-01 DIAGNOSIS — O34.219 HISTORY OF CESAREAN DELIVERY, ANTEPARTUM: ICD-10-CM

## 2024-10-01 DIAGNOSIS — Z34.83 MULTIGRAVIDA IN THIRD TRIMESTER: ICD-10-CM

## 2024-10-01 PROCEDURE — 99213 OFFICE O/P EST LOW 20 MIN: CPT | Performed by: STUDENT IN AN ORGANIZED HEALTH CARE EDUCATION/TRAINING PROGRAM

## 2024-10-01 NOTE — PROGRESS NOTES
Ayesha is a 24 y.o.  30w1d. Reports ++FM, no LOF, VB, or regular contractions.     Some nausea    Vitals:    10/01/24 0800   BP: 118/78     S=D  +FHTs  Assessment & Plan  30 weeks gestation of pregnancy  First tri and third tri labs done together, missing 1hr GTT  Rh status POS  Growth scheduled 10/15/24  TDAP: received  Birth Plan: TOLAC, Reviewed risks with TOLAC including need for repeat CS, risk of uterine rupture (<1% with LT scar) and risks of scheduled repeat CS including bleeding, infection, etc  Feeding: breast, ordered pump  Peds: [facility]  Contraception: open to more kids; nothing for now  Discussed pre-term labor precautions  Return to office in 2 weeks          History of  delivery, antepartum  Describes SROM and pitocin without dilation beyond certain point: Arrest of dilation vs failed IOL       Multigravida in third trimester

## 2024-10-01 NOTE — ASSESSMENT & PLAN NOTE
Describes SROM and pitocin without dilation beyond certain point: Arrest of dilation vs failed IOL

## 2024-10-02 NOTE — PATIENT COMMUNICATION
Discussed with Mat. Unable to medically take patient out of work starting 9/15 till delivery. Called patient to let her know. Patient will call employer to discuss. Issued a refund.

## 2024-10-03 ENCOUNTER — EVALUATION (OUTPATIENT)
Dept: PHYSICAL THERAPY | Age: 24
End: 2024-10-03
Payer: COMMERCIAL

## 2024-10-03 DIAGNOSIS — M54.50 ACUTE BILATERAL LOW BACK PAIN WITHOUT SCIATICA: ICD-10-CM

## 2024-10-03 DIAGNOSIS — M77.52: Primary | ICD-10-CM

## 2024-10-03 DIAGNOSIS — M25.552 BILATERAL HIP PAIN: ICD-10-CM

## 2024-10-03 DIAGNOSIS — M75.02 ADHESIVE CAPSULITIS OF LEFT SHOULDER: ICD-10-CM

## 2024-10-03 DIAGNOSIS — M25.551 BILATERAL HIP PAIN: ICD-10-CM

## 2024-10-03 PROCEDURE — 97163 PT EVAL HIGH COMPLEX 45 MIN: CPT | Performed by: PHYSICAL THERAPIST

## 2024-10-03 PROCEDURE — 97110 THERAPEUTIC EXERCISES: CPT | Performed by: PHYSICAL THERAPIST

## 2024-10-03 NOTE — PROGRESS NOTES
PT Evaluation     Today's date: 10/3/2024  Patient name: Ayesha Tomlinson  : 2000  MRN: [id number]  Referring provider: Brock Sampson PA*  Dx:   Encounter Diagnosis     ICD-10-CM    1. Adhesive capsulitis of ankle, left  M77.52       2. Acute bilateral low back pain without sciatica  M54.50       3. Bilateral hip pain  M25.551     M25.552                      Assessment  Impairments: abnormal or restricted ROM, activity intolerance, impaired physical strength, lacks appropriate home exercise program, pain with function and scapular dyskinesis    Assessment details: Patient seen for PT evaluation for acute L shoulder adhesive capsulitis and for PT for back and hip pain related to pregnancy- per patient's OB. Patient does present with decreased L shoulder AROM and PROM 2* pain and tightness in shoulder joint. Patient lacks strength in L UE 2* shoulder pain/ROM limitations. No complaints of numbness/tingling in UE nor LE.  PT assessed lumbar spine ROM and strength. Patient presents with pain with AROM in the lumbar spine, tightness and mild weakness in the hips which could also be contributing to patient's back pain with walking, sit>stand, squatting and with work duties.   PT administered HEP for gentle stretches for L shoulder ROM, light scapular strengthening to address patient's L shoulder pain. Light standing lumbar extension to add to HEP for lumbar pain; and reviewed patient's hip stretches that she is currently performing.   Understanding of Dx/Px/POC: good     Prognosis: good    Goals  STG/LTG to be met in 4-6 week  - Increase AROM by 5-10 degrees L shoulder flexion  - Increase L UE strength to 5/5 grossly throughout  - reduce lumbar pain with all ROM   - Patient to be able to return to work- reaching and lifting overhead without increase in shoulder pain.  - Patient to be able to tolerate core stabilization program without increase in back or shoulder pain.      Plan  Patient would benefit  "from: skilled physical therapy  Planned modality interventions: cryotherapy and thermotherapy: hydrocollator packs    Planned therapy interventions: IASTM, joint mobilization, kinesiology taping, manual therapy, neuromuscular re-education, patient/caregiver education, postural training, strengthening, therapeutic exercise, therapeutic activities, stretching, gait training, home exercise program, body mechanics training and abdominal trunk stabilization    Frequency: 1-2x week  Duration in weeks: 6  Treatment plan discussed with: patient        Subjective Evaluation    History of Present Illness  Mechanism of injury: Patient with onset L shoulder pain s/p \"sleeping wrong\" - starting x 2-3 weeks ago. Patient reports that she woke up on her L side and usually sleeps on her back. Patient is out of work since her shoulder injury. Patient did go to the ED, was recommended to ortho, and patient currently with restrictions for work at this time, currently out of work 2* shoulder limitations.   Patient's work- standing, walking, light lifting overhead <> waist heights, pushing/pulling, bending and squatting; can sit if needed. 12 hr shifts, 7 days in a 14 day period.   Patient's goal is to be able to return to work.     Patient is currently out of work because of the work restriction.    Patient also with recent script for patient's back and hip pain during pregnancy- which PT is able to address these concerns as well.   Patient Goals  Patient goals for therapy: decreased pain, increased motion, increased strength, independence with ADLs/IADLs and return to work    Pain  Current pain ratin  At best pain ratin  At worst pain ratin  Location: L shoulder  Quality: dull ache  Relieving factors: relaxation  Aggravating factors: sitting, standing, walking, stair climbing, overhead activity and lifting  Progression: no change    Social Support    Employment status: not working    Diagnostic Tests  No diagnostic tests " performed  Treatments  Previous treatment: physical therapy      Objective     Concurrent Complaints      Additional Special Questions  pregnant    Active Range of Motion   Left Shoulder   Flexion: 90 degrees with pain  Abduction: 80 degrees with pain  External rotation 0°: 45 degrees   Internal rotation 0°: 50 degrees     Right Shoulder   Normal active range of motion    Left Elbow   Normal active range of motion    Right Elbow   Normal active range of motion    Lumbar   Flexion:  WFL  Extension:  WFL  Left lateral flexion:  WFL and with pain  Right lateral flexion:  WFL and with pain  Left rotation:  WFL and with pain  Right rotation:  WFL and with pain    Passive Range of Motion   Left Shoulder   Flexion: 150 degrees with pain  Abduction: 140 degrees with pain  External rotation 45°: 50 degrees with pain  Internal rotation 45°: WFL    Additional Passive Range of Motion Details  Tightness pain at end range    Strength/Myotome Testing     Left Shoulder     Planes of Motion   Flexion: 3+   Abduction: 3+   External rotation at 0°: 3+   Internal rotation at 0°: 3+     Right Shoulder   Normal muscle strength    Left Elbow   Normal strength    Right Elbow   Normal strength    Left Hip   Planes of Motion   Flexion: 4  Abduction: 4-  Adduction: 5    Right Hip   Planes of Motion   Flexion: 4  Abduction: 4-  Adduction: 5    Left Knee   Flexion: 5  Extension: 5    Right Knee   Flexion: 5  Extension: 5    Left Ankle/Foot   Dorsiflexion: 5  Plantar flexion: 5    Right Ankle/Foot   Dorsiflexion: 5  Plantar flexion: 5             Precautions: PREGNANT   NO supine TE      Manuals 10/3 - eval                                  Neuro Re-Ed              Median nerve glides                                          Ther Ex              pulleys x3 min             nustep              TB rows and extension OTB 10x extension- gave YTB for home      TB B ER       Doorway vs corner pec stretch        Wall push ups       Shoulder wall slides  flexion                     Standing back bends       Sit<>stands- elevated height              Leg press       Hip abd       Ham curls              Ther Activity                     Gait Training                     Modalities       MH L shoulder

## 2024-10-03 NOTE — LETTER
October 3, 2024    Brock Sampson PA-C  [address] [facility] [address]    Patient: Ayesha Tomlinson   YOB: 2000   Date of Visit: 10/3/2024     Encounter Diagnosis     ICD-10-CM    1. Adhesive capsulitis of ankle, left  M77.52       2. Acute bilateral low back pain without sciatica  M54.50       3. Bilateral hip pain  M25.551     M25.552       4. Adhesive capsulitis of left shoulder  M75.02 Ambulatory Referral to Physical Therapy          Dear Dr. Sampson:    Thank you for your recent referral of Ayesha Tomlinson. Please review the attached evaluation summary from Ayesha's recent visit.     Please verify that you agree with the plan of care by signing the attached order.     If you have any questions or concerns, please do not hesitate to call.     I sincerely appreciate the opportunity to share in the care of one of your patients and hope to have another opportunity to work with you in the near future.       Sincerely,    Cathy Santana, PT      Referring Provider:      I certify that I have read the below Plan of Care and certify the need for these services furnished under this plan of treatment while under my care.                    Brock Sampson PA-C  [address] [facility] [address]  Via In Basket          PT Evaluation     Today's date: 10/3/2024  Patient name: Ayesha Tomlinson  : 2000  MRN: [id number]  Referring provider: Brock Sampson PA*  Dx:   Encounter Diagnosis     ICD-10-CM    1. Adhesive capsulitis of ankle, left  M77.52       2. Acute bilateral low back pain without sciatica  M54.50       3. Bilateral hip pain  M25.551     M25.552                      Assessment  Impairments: abnormal or restricted ROM, activity intolerance, impaired physical strength, lacks appropriate home exercise program, pain with function and scapular dyskinesis    Assessment details: Patient seen for PT evaluation for acute L shoulder adhesive  capsulitis and for PT for back and hip pain related to pregnancy- per patient's OB. Patient does present with decreased L shoulder AROM and PROM 2* pain and tightness in shoulder joint. Patient lacks strength in L UE 2* shoulder pain/ROM limitations. No complaints of numbness/tingling in UE nor LE.  PT assessed lumbar spine ROM and strength. Patient presents with pain with AROM in the lumbar spine, tightness and mild weakness in the hips which could also be contributing to patient's back pain with walking, sit>stand, squatting and with work duties.   PT administered HEP for gentle stretches for L shoulder ROM, light scapular strengthening to address patient's L shoulder pain. Light standing lumbar extension to add to HEP for lumbar pain; and reviewed patient's hip stretches that she is currently performing.   Understanding of Dx/Px/POC: good     Prognosis: good    Goals  STG/LTG to be met in 4-6 week  - Increase AROM by 5-10 degrees L shoulder flexion  - Increase L UE strength to 5/5 grossly throughout  - reduce lumbar pain with all ROM   - Patient to be able to return to work- reaching and lifting overhead without increase in shoulder pain.  - Patient to be able to tolerate core stabilization program without increase in back or shoulder pain.      Plan  Patient would benefit from: skilled physical therapy  Planned modality interventions: cryotherapy and thermotherapy: hydrocollator packs    Planned therapy interventions: IASTM, joint mobilization, kinesiology taping, manual therapy, neuromuscular re-education, patient/caregiver education, postural training, strengthening, therapeutic exercise, therapeutic activities, stretching, gait training, home exercise program, body mechanics training and abdominal trunk stabilization    Frequency: 1-2x week  Duration in weeks: 6  Treatment plan discussed with: patient        Subjective Evaluation    History of Present Illness  Mechanism of injury: Patient with onset L  "shoulder pain s/p \"sleeping wrong\" - starting x 2-3 weeks ago. Patient reports that she woke up on her L side and usually sleeps on her back. Patient is out of work since her shoulder injury. Patient did go to the ED, was recommended to ortho, and patient currently with restrictions for work at this time, currently out of work 2* shoulder limitations.   Patient's work- standing, walking, light lifting overhead <> waist heights, pushing/pulling, bending and squatting; can sit if needed. 12 hr shifts, 7 days in a 14 day period.   Patient's goal is to be able to return to work.     Patient is currently out of work because of the work restriction.    Patient also with recent script for patient's back and hip pain during pregnancy- which PT is able to address these concerns as well.   Patient Goals  Patient goals for therapy: decreased pain, increased motion, increased strength, independence with ADLs/IADLs and return to work    Pain  Current pain ratin  At best pain ratin  At worst pain ratin  Location: L shoulder  Quality: dull ache  Relieving factors: relaxation  Aggravating factors: sitting, standing, walking, stair climbing, overhead activity and lifting  Progression: no change    Social Support    Employment status: not working    Diagnostic Tests  No diagnostic tests performed  Treatments  Previous treatment: physical therapy      Objective     Concurrent Complaints      Additional Special Questions  pregnant    Active Range of Motion   Left Shoulder   Flexion: 90 degrees with pain  Abduction: 80 degrees with pain  External rotation 0°: 45 degrees   Internal rotation 0°: 50 degrees     Right Shoulder   Normal active range of motion    Left Elbow   Normal active range of motion    Right Elbow   Normal active range of motion    Lumbar   Flexion:  WFL  Extension:  WFL  Left lateral flexion:  WFL and with pain  Right lateral flexion:  WFL and with pain  Left rotation:  WFL and with pain  Right rotation:  " WFL and with pain    Passive Range of Motion   Left Shoulder   Flexion: 150 degrees with pain  Abduction: 140 degrees with pain  External rotation 45°: 50 degrees with pain  Internal rotation 45°: WFL    Additional Passive Range of Motion Details  Tightness pain at end range    Strength/Myotome Testing     Left Shoulder     Planes of Motion   Flexion: 3+   Abduction: 3+   External rotation at 0°: 3+   Internal rotation at 0°: 3+     Right Shoulder   Normal muscle strength    Left Elbow   Normal strength    Right Elbow   Normal strength    Left Hip   Planes of Motion   Flexion: 4  Abduction: 4-  Adduction: 5    Right Hip   Planes of Motion   Flexion: 4  Abduction: 4-  Adduction: 5    Left Knee   Flexion: 5  Extension: 5    Right Knee   Flexion: 5  Extension: 5    Left Ankle/Foot   Dorsiflexion: 5  Plantar flexion: 5    Right Ankle/Foot   Dorsiflexion: 5  Plantar flexion: 5             Precautions: PREGNANT   NO supine TE      Manuals 10/3 - eval                                  Neuro Re-Ed              Median nerve glides                                          Ther Ex              pulleys x3 min             nustep              TB rows and extension OTB 10x extension- gave YTB for home      TB B ER       Doorway vs corner pec stretch        Wall push ups       Shoulder wall slides flexion                     Standing back bends       Sit<>stands- elevated height              Leg press       Hip abd       Ham curls              Ther Activity                     Gait Training                     Modalities       MH L shoulder

## 2024-10-07 ENCOUNTER — OFFICE VISIT (OUTPATIENT)
Dept: PHYSICAL THERAPY | Age: 24
End: 2024-10-07
Payer: COMMERCIAL

## 2024-10-07 DIAGNOSIS — M77.52: Primary | ICD-10-CM

## 2024-10-07 DIAGNOSIS — M54.50 ACUTE BILATERAL LOW BACK PAIN WITHOUT SCIATICA: ICD-10-CM

## 2024-10-07 DIAGNOSIS — M75.02 ADHESIVE CAPSULITIS OF LEFT SHOULDER: ICD-10-CM

## 2024-10-07 DIAGNOSIS — M25.552 BILATERAL HIP PAIN: ICD-10-CM

## 2024-10-07 DIAGNOSIS — M25.551 BILATERAL HIP PAIN: ICD-10-CM

## 2024-10-07 PROCEDURE — 97110 THERAPEUTIC EXERCISES: CPT | Performed by: PHYSICAL THERAPIST

## 2024-10-07 NOTE — PROGRESS NOTES
Daily Note     Today's date: 10/7/2024  Patient name: Ayesha Tomlinson  : 2000  MRN: [id number]  Referring provider: Brock Sampson PA*  Dx:   Encounter Diagnosis     ICD-10-CM    1. Adhesive capsulitis of ankle, left  M77.52       2. Acute bilateral low back pain without sciatica  M54.50       3. Bilateral hip pain  M25.551     M25.552       4. Adhesive capsulitis of left shoulder  M75.02                      Subjective: Patient reports that her shoulder is feeling better, having less pain, feels her ROM is improving with PT. Patient  having full body pain, rates 5/10 currently- back, hips and L shoulder- limited sleeping 2* pain and not comfortable in bed last night.       Objective: See treatment diary below      Assessment: Tolerated treatment well. Patient demonstrated fatigue post treatment, exhibited good technique with therapeutic exercises, and would benefit from continued PT      Plan: Continue per plan of care.      Precautions: PREGNANT   NO supine TE      Manuals 10/3 - eval 10/7                                 Neuro Re-Ed              Median nerve glides  NV                                         Ther Ex              pulleys x3 min x3 min            nustep  Bike x 10' Gentle            TB rows and extension OTB 10x extension- gave YTB for home OTB 10x        Shld isometrics NV for L     TB B ER       Doorway vs corner pec stretch   5x:10 doorway     Wall push ups       Shoulder wall slides flexion  10x ea                   Standing back bends  10x with pillow     Sit<>stands- elevated height  Too much pain for this today            Leg press  NV trial     Hip abd  NV trial     Ham curls  NV trial            Ther Activity                     Gait Training                     Modalities       MH L shoulder   X10' post

## 2024-10-10 ENCOUNTER — OFFICE VISIT (OUTPATIENT)
Dept: PHYSICAL THERAPY | Age: 24
End: 2024-10-10
Payer: COMMERCIAL

## 2024-10-10 ENCOUNTER — TELEPHONE (OUTPATIENT)
Dept: OBGYN CLINIC | Facility: HOSPITAL | Age: 24
End: 2024-10-10

## 2024-10-10 DIAGNOSIS — M75.02 ADHESIVE CAPSULITIS OF LEFT SHOULDER: ICD-10-CM

## 2024-10-10 DIAGNOSIS — M25.552 BILATERAL HIP PAIN: ICD-10-CM

## 2024-10-10 DIAGNOSIS — M77.52: Primary | ICD-10-CM

## 2024-10-10 DIAGNOSIS — M54.50 ACUTE BILATERAL LOW BACK PAIN WITHOUT SCIATICA: ICD-10-CM

## 2024-10-10 DIAGNOSIS — M25.551 BILATERAL HIP PAIN: ICD-10-CM

## 2024-10-10 PROCEDURE — 97110 THERAPEUTIC EXERCISES: CPT | Performed by: PHYSICAL THERAPIST

## 2024-10-10 NOTE — TELEPHONE ENCOUNTER
Caller: patient     Doctor/Office: orthopedics    Call regarding :  returning missed call      Call was transferred to: ortho

## 2024-10-10 NOTE — TELEPHONE ENCOUNTER
Explained to pt if employer unable to accommodate light duty, that falls on employer, explained dr parker does not have pt out of work or working with restrictions.

## 2024-10-10 NOTE — TELEPHONE ENCOUNTER
Caller: Patient    Doctor: Mckenna    Reason for call: Returned MD's call. Asking to speak w/her. MD will call the patient back    Call back#: [phone]

## 2024-10-10 NOTE — PROGRESS NOTES
Daily Note     Today's date: 10/10/2024  Patient name: Ayesha Tomlinsno  : 2000  MRN: [id number]  Referring provider: Brock Sampson PA*  Dx:   Encounter Diagnosis     ICD-10-CM    1. Adhesive capsulitis of ankle, left  M77.52       2. Acute bilateral low back pain without sciatica  M54.50       3. Bilateral hip pain  M25.551     M25.552       4. Adhesive capsulitis of left shoulder  M75.02                      Subjective: Patient reports that she's been doing well, feels the shoulder ROM is improving; however, still feels pain at end range and with using the shoulders (eg lifting).      Objective: See treatment diary below      Assessment: Tolerated treatment well. Patient demonstrated fatigue post treatment, exhibited good technique with therapeutic exercises, and would benefit from continued PT PT attempted to progress program to include additional hip and hamstring strengthening, as well as isometrics for the shoulder. Pain with shoulder flexion isometrics, plan to trial isometrics for abduction and extension (separately).       Plan: Continue per plan of care.      Precautions: PREGNANT   NO supine TE      Manuals 10/3 - eval 10/7 10/10                                 Neuro Re-Ed              Median nerve glides  NV  NT                                              Ther Ex              pulleys x3 min x3 min X5 min            nustep  Bike x 10' Gentle X10' L1           TB rows and extension OTB 10x extension- gave YTB for home OTB 10x  OTB 15x ea      Shld isometrics NV for L Flexion - pain today. 5x:05, NV abd, and ext     TB B ER       Doorway vs corner pec stretch   5x:10 doorway 5x:10 doorway    Wall push ups       Shoulder wall slides flexion  10x ea NT                  Standing back bends  10x with pillow 15x    Sit<>stands- elevated height  Too much pain for this today                   Hip abd  NV trial 35# 2x10    Ham curls  NV trial 30# 2x10           Ther Activity                      Gait Training                     Modalities        L shoulder   X10' post

## 2024-10-11 NOTE — TELEPHONE ENCOUNTER
Caller: pt     Doctor: Mckenna     Reason for call: pt is requesting a call back in regard to yesterday's call.       Call back#: Phone:   [phone]

## 2024-10-14 ENCOUNTER — OFFICE VISIT (OUTPATIENT)
Dept: PHYSICAL THERAPY | Age: 24
End: 2024-10-14
Payer: COMMERCIAL

## 2024-10-14 DIAGNOSIS — M77.52: Primary | ICD-10-CM

## 2024-10-14 DIAGNOSIS — M54.50 ACUTE BILATERAL LOW BACK PAIN WITHOUT SCIATICA: ICD-10-CM

## 2024-10-14 PROBLEM — Z34.93 PRENATAL CARE IN THIRD TRIMESTER: Status: ACTIVE | Noted: 2024-10-14

## 2024-10-14 PROCEDURE — 97110 THERAPEUTIC EXERCISES: CPT | Performed by: PHYSICAL THERAPIST

## 2024-10-14 NOTE — ASSESSMENT & PLAN NOTE
Labor precautions reviewed  Fetal kick counts reviewed  Tdap: 09/20/24  Labs UTD; missing 1 hr gtt- advised to complete!   Rh+  Flu: given today  RSV: desires at next visit  Birth plan: TOLAC; previously reviewed risks with Dr. Dickerson.   Feeding: breast, pump ordered.   Pediatrician: [facility]  Contraception: declined  GBS: at 36 weeks   Growth US: 10/15/24.  Delivery consent signed today.   Reviewed:   - FKC - 10 in 2 hours  -Perineal Massages to start at 34 weeks  -Last week of pregnancy and when to call

## 2024-10-15 ENCOUNTER — ULTRASOUND (OUTPATIENT)
Dept: PERINATAL CARE | Facility: CLINIC | Age: 24
End: 2024-10-15
Payer: COMMERCIAL

## 2024-10-15 ENCOUNTER — ROUTINE PRENATAL (OUTPATIENT)
Dept: OBGYN CLINIC | Facility: CLINIC | Age: 24
End: 2024-10-15
Payer: COMMERCIAL

## 2024-10-15 VITALS
HEIGHT: 63 IN | WEIGHT: 187 LBS | BODY MASS INDEX: 33.13 KG/M2 | DIASTOLIC BLOOD PRESSURE: 78 MMHG | SYSTOLIC BLOOD PRESSURE: 110 MMHG

## 2024-10-15 VITALS
WEIGHT: 187.8 LBS | HEIGHT: 63 IN | DIASTOLIC BLOOD PRESSURE: 72 MMHG | SYSTOLIC BLOOD PRESSURE: 116 MMHG | BODY MASS INDEX: 33.27 KG/M2 | HEART RATE: 120 BPM

## 2024-10-15 DIAGNOSIS — Z34.93 PRENATAL CARE IN THIRD TRIMESTER: ICD-10-CM

## 2024-10-15 DIAGNOSIS — Z3A.32 32 WEEKS GESTATION OF PREGNANCY: ICD-10-CM

## 2024-10-15 DIAGNOSIS — O26.03 MATERNAL EXCESSIVE WEIGHT GAIN, THIRD TRIMESTER: Primary | ICD-10-CM

## 2024-10-15 DIAGNOSIS — O34.219 HISTORY OF CESAREAN DELIVERY, ANTEPARTUM: Primary | ICD-10-CM

## 2024-10-15 PROCEDURE — 99213 OFFICE O/P EST LOW 20 MIN: CPT

## 2024-10-15 PROCEDURE — 90656 IIV3 VACC NO PRSV 0.5 ML IM: CPT

## 2024-10-15 PROCEDURE — 76816 OB US FOLLOW-UP PER FETUS: CPT | Performed by: OBSTETRICS & GYNECOLOGY

## 2024-10-15 PROCEDURE — 99212 OFFICE O/P EST SF 10 MIN: CPT | Performed by: OBSTETRICS & GYNECOLOGY

## 2024-10-15 PROCEDURE — 90471 IMMUNIZATION ADMIN: CPT

## 2024-10-15 NOTE — PROGRESS NOTES
The patient was seen today for an ultrasound.  Please see ultrasound report (located under Ob Procedures) for additional details.   Thank you very much for allowing us to participate in the care of this very nice patient.  Should you have any questions, please do not hesitate to contact me.     James Stubbs MD FACOG  Attending Physician, Maternal-Fetal Medicine  [facility]

## 2024-10-15 NOTE — PROGRESS NOTES
Flu vaccine administered in left deltoid ,pt tolerated well. No previous history of adverse reactions to any immunizations. Pt given VIS at time of administration.   Bernarda ALFONSO

## 2024-10-17 ENCOUNTER — OFFICE VISIT (OUTPATIENT)
Dept: PHYSICAL THERAPY | Age: 24
End: 2024-10-17
Payer: COMMERCIAL

## 2024-10-17 DIAGNOSIS — M25.552 BILATERAL HIP PAIN: ICD-10-CM

## 2024-10-17 DIAGNOSIS — M54.50 ACUTE BILATERAL LOW BACK PAIN WITHOUT SCIATICA: ICD-10-CM

## 2024-10-17 DIAGNOSIS — M77.52: Primary | ICD-10-CM

## 2024-10-17 DIAGNOSIS — M25.551 BILATERAL HIP PAIN: ICD-10-CM

## 2024-10-17 PROCEDURE — 97110 THERAPEUTIC EXERCISES: CPT | Performed by: PHYSICAL THERAPIST

## 2024-10-17 NOTE — PROGRESS NOTES
Daily Note     Today's date: 10/17/2024  Patient name: Ayesha Tomlinson  : 2000  MRN: [id number]  Referring provider: Brock Sampson PA*  Dx:   Encounter Diagnosis     ICD-10-CM    1. Adhesive capsulitis of ankle, left  M77.52       2. Acute bilateral low back pain without sciatica  M54.50       3. Bilateral hip pain  M25.551     M25.552                      Subjective: Patient reports that she is doing well, still feeling fatigued and sleeping when she can.  Patient does feel the shoulder ROM and strength is improving, having more soreness in her back and hips today.       Objective: See treatment diary below      Assessment: Tolerated treatment well. Patient demonstrated fatigue post treatment, exhibited good technique with therapeutic exercises, would benefit from continued PT, and Good tolerance to exercise program today and decrease in pain with TE today. Progressed program where able, increased weight with cybex to progress patient's hip strength to support the LB spine and hips. Finished with  for LBP.  Progressing well with shoulder strength and isometrics.       Plan: Continue per plan of care.       Precautions: PREGNANT   NO supine TE      Manuals 10/17 10/7 10/10  10/14                               Neuro Re-Ed              Median nerve glides  NV  NT NT                                             Ther Ex              pulleys x3 min x3 min X5 min  X3 min          Bike  L1 x10' Bike x 10' Gentle X10' L1 X10' L1                 Shoulder isometrics, B Abd, ext 5x:10 Shld isometrics NV for L Flexion - pain today. 5x:05, NV abd, and ext  NT   TB B ER       Doorway vs corner pec stretch  5x:10 doorway 5x:10 doorway 5x:10 doorway 5x:10   Wall push ups 10x   10x           Standing hip abd 2x10 ea   Standing hip abd 10x 2 ea          Standing back bends 15x 10x with pillow 15x 15x   Squats  10x Too much pain for this today  10x                 Hip abd 40# 30x  NV trial 35# 2x10 40# 30x   Ham  georgie 35# 30x NV trial 30# 2x10 35# 30x          Ther Activity                     Gait Training                     Modalities       MH L shoulder  X 10' LB seated X10' post  X10 post- LB

## 2024-10-24 ENCOUNTER — TELEPHONE (OUTPATIENT)
Age: 24
End: 2024-10-24

## 2024-10-24 ENCOUNTER — OFFICE VISIT (OUTPATIENT)
Dept: PHYSICAL THERAPY | Age: 24
End: 2024-10-24
Payer: COMMERCIAL

## 2024-10-24 DIAGNOSIS — M77.52: Primary | ICD-10-CM

## 2024-10-24 DIAGNOSIS — M54.50 ACUTE BILATERAL LOW BACK PAIN WITHOUT SCIATICA: ICD-10-CM

## 2024-10-24 PROCEDURE — 97110 THERAPEUTIC EXERCISES: CPT | Performed by: PHYSICAL THERAPIST

## 2024-10-24 NOTE — PROGRESS NOTES
Daily Note     Today's date: 10/24/2024  Patient name: Ayesha Tomlinson  : 2000  MRN: [id number]  Referring provider: Brock Sampson PA*  Dx:   Encounter Diagnosis     ICD-10-CM    1. Adhesive capsulitis of ankle, left  M77.52       2. Acute bilateral low back pain without sciatica  M54.50                      Subjective: Patient reports that she has been doing well, feeling better that she and her daughter just recovered from a cold. Patient does feel the exercises are beneficial to her.       Objective: See treatment diary below      Assessment: Tolerated treatment well. PT continuing with current program, progressing patient as tolerated, no pain with TE. Finishing with  for LB muscle fatigue post exercising today.       Plan: Continue per plan of care.      Precautions: PREGNANT   NO supine TE      Manuals 10/17 10/24 10/10  10/14                               Neuro Re-Ed              Median nerve glides  NV  NT NT                                             Ther Ex              pulleys x3 min x3 min X5 min  X3 min          Bike  L1 x10' Bike x 10' Gentle X10' L1 X10' L1                 Shoulder isometrics, B Abd, ext 5x:10 Abd, ext 5x:10 Flexion - pain today. 5x:05, NV abd, and ext  NT   TB B ER       Doorway vs corner pec stretch  5x:10 doorway 5x:10 doorway 5x:10 doorway 5x:10   Wall push ups 10x 20x  10x           Standing hip abd 2x10 ea 2x10  Standing hip abd 10x 2 ea          Standing back bends 15x 15x with pillow 15x 15x   Squats  10x 10x  10x                 Hip abd 40# 30x  40# 30x 35# 2x10 40# 30x   Ham curls 35# 30x 40# 30x 30# 2x10 35# 30x          Ther Activity                     Gait Training                     Modalities       MH L shoulder  X 10' LB seated X10' post  X10 post- LB

## 2024-10-24 NOTE — TELEPHONE ENCOUNTER
Called and left detailed message for Tracy stating that she can fax over a form to our office and we can have our Disability/Leave Team complete it with all of the requested information. Provided office fax [phone]

## 2024-10-24 NOTE — TELEPHONE ENCOUNTER
Caller: hermilo with [employer]    Doctor: Mckenna     Reason for call: has some questions regarding patients injury, would like to know the diagnosis, treatment dates & how long will she be out of work for     Please advise     Call back#: [phone]

## 2024-10-28 ENCOUNTER — OFFICE VISIT (OUTPATIENT)
Dept: PHYSICAL THERAPY | Age: 24
End: 2024-10-28
Payer: COMMERCIAL

## 2024-10-28 ENCOUNTER — ROUTINE PRENATAL (OUTPATIENT)
Dept: OBGYN CLINIC | Facility: CLINIC | Age: 24
End: 2024-10-28
Payer: COMMERCIAL

## 2024-10-28 VITALS
DIASTOLIC BLOOD PRESSURE: 82 MMHG | SYSTOLIC BLOOD PRESSURE: 120 MMHG | WEIGHT: 194 LBS | BODY MASS INDEX: 34.38 KG/M2 | HEIGHT: 63 IN

## 2024-10-28 DIAGNOSIS — M54.50 ACUTE BILATERAL LOW BACK PAIN WITHOUT SCIATICA: ICD-10-CM

## 2024-10-28 DIAGNOSIS — M77.52: Primary | ICD-10-CM

## 2024-10-28 DIAGNOSIS — Z3A.34 34 WEEKS GESTATION OF PREGNANCY: Primary | ICD-10-CM

## 2024-10-28 PROBLEM — Z3A.26 PREGNANCY WITH 26 COMPLETED WEEKS GESTATION: Status: RESOLVED | Noted: 2024-09-03 | Resolved: 2024-10-28

## 2024-10-28 PROCEDURE — 97110 THERAPEUTIC EXERCISES: CPT | Performed by: PHYSICAL THERAPIST

## 2024-10-28 PROCEDURE — 99213 OFFICE O/P EST LOW 20 MIN: CPT | Performed by: STUDENT IN AN ORGANIZED HEALTH CARE EDUCATION/TRAINING PROGRAM

## 2024-10-28 NOTE — ASSESSMENT & PLAN NOTE
Ayesha presents today for her 34 week visit. She is currently 34w0d.          Vitals:    10/28/24 1100   BP: 120/82               I have reviewed the signs and symptoms of  labor with the patient as well as the expectations for the 36 week visit.  I explained we would be doing a GBS culture. I also briefly reviewed the expected mood changes after delivery, focusing on the signs and symptoms of postpartum depression.        We reviewed patient my continue to work until she delivers. If she elects to take maternity leave prior to delivery, she can bring in the appropriate paperwork to the office.

## 2024-10-28 NOTE — PROGRESS NOTES
Assessment/Plan  Problem List Items Addressed This Visit       34 weeks gestation of pregnancy - Catalina Hodge presents today for her 34 week visit. She is currently 34w0d.          Vitals:    10/28/24 1100   BP: 120/82               I have reviewed the signs and symptoms of  labor with the patient as well as the expectations for the 36 week visit.  I explained we would be doing a GBS culture. I also briefly reviewed the expected mood changes after delivery, focusing on the signs and symptoms of postpartum depression.        We reviewed patient my continue to work until she delivers. If she elects to take maternity leave prior to delivery, she can bring in the appropriate paperwork to the office.                 Subjective    Ayesha is a 24 y.o. adult,  with an Estimated Date of Delivery: 24 with a current gestational age of 34w0d. Patient reports nausea.and mild cramping. Fetal movement: active.     History  The following portions of the patient's history were reviewed and updated as appropriate: allergies, current medications, past family history, past medical history, past social history, past surgical history and problem list.    Objective  Vitals:    10/28/24 1100   BP: 120/82     FHT: 155  FH: 35  Urine: neg/neg

## 2024-10-28 NOTE — PROGRESS NOTES
Daily Note     Today's date: 10/28/2024  Patient name: Ayesha Tomlinson  : 2000  MRN: [id number]  Referring provider: Brock Sampson PA*  Dx:   Encounter Diagnosis     ICD-10-CM    1. Adhesive capsulitis of ankle, left  M77.52       2. Acute bilateral low back pain without sciatica  M54.50                      Subjective: Patient reports that her L shoulder is still painful, but feeling better with PT treatment. Patient also feeling less back and B hip pain with stretches and exercises in PT.       Objective: See treatment diary below      Assessment: Tolerated treatment well. Continuing with current program, focusing on gentle stretching and strengthening within patient's tolerance. No pain with treatment, finishing with  for muscle relief.       Plan: Continue per plan of care.      Precautions: PREGNANT   NO supine TE      Manuals 10/17 10/24 10/28 10/14                               Neuro Re-Ed              Median nerve glides  NV  NT NT                                             Ther Ex              pulleys x3 min x3 min X5 min  X3 min          Bike  L1 x10' Bike x 10' Gentle X10' L1 X10' L1                 Shoulder isometrics, B Abd, ext 5x:10 Abd, ext 5x:10 Flexion - pain today. 5x:05, NV abd, and ext  NT   TB B ER       Doorway vs corner pec stretch  5x:10 doorway 5x:10 doorway 5x:10 doorway 5x:10   Wall push ups 10x 20x 20x 10x           Standing hip abd 2x10 ea 2x10 2x10 Standing hip abd 10x 2 ea          Standing back bends 15x 15x with pillow 15x 15x   Squats  10x 10x 10x 10x                 Hip abd 40# 30x  40# 30x 40# 2x10 40# 30x   Ham curls 35# 30x 40# 30x 40# 2x10 35# 30x          Ther Activity                     Gait Training                     Modalities        L shoulder  X 10' LB seated X10' post X10' post X10 post- LB

## 2024-10-31 ENCOUNTER — OFFICE VISIT (OUTPATIENT)
Dept: PHYSICAL THERAPY | Age: 24
End: 2024-10-31
Payer: COMMERCIAL

## 2024-10-31 DIAGNOSIS — M25.551 BILATERAL HIP PAIN: ICD-10-CM

## 2024-10-31 DIAGNOSIS — M54.50 ACUTE BILATERAL LOW BACK PAIN WITHOUT SCIATICA: ICD-10-CM

## 2024-10-31 DIAGNOSIS — M25.552 BILATERAL HIP PAIN: ICD-10-CM

## 2024-10-31 DIAGNOSIS — M75.02 ADHESIVE CAPSULITIS OF LEFT SHOULDER: ICD-10-CM

## 2024-10-31 DIAGNOSIS — M77.52: Primary | ICD-10-CM

## 2024-10-31 PROCEDURE — 97110 THERAPEUTIC EXERCISES: CPT | Performed by: PHYSICAL THERAPIST

## 2024-10-31 NOTE — PROGRESS NOTES
Daily Note     Today's date: 10/31/2024  Patient name: Ayesha Tomlinson  : 2000  MRN: [id number]  Referring provider: Brock Sampson PA*  Dx:   Encounter Diagnosis     ICD-10-CM    1. Adhesive capsulitis of ankle, left  M77.52       2. Acute bilateral low back pain without sciatica  M54.50       3. Bilateral hip pain  M25.551     M25.552       4. Adhesive capsulitis of left shoulder  M75.02                      Subjective: Patient reports that she has been feeling better in the shoulder and the back since onset of PT.       Objective: See treatment diary below      Assessment: Tolerated treatment well. PT progressed patient's program to include additional strengthening and ROM TE for shoulder adhesive capsulitis. Increased weight with cybex hip strengthening, no pain with TE. Patient progressing well with shoulder strength and ROM since IE. Discussed plan for patient to continue with PT with the goal of progressing UE and thoracic spine strength to assist with patient being able to carry her baby postpartum.       Plan: Continue per plan of care.      Precautions: PREGNANT   NO supine TE      Manuals 10/17 10/24 10/28 10/31                               Neuro Re-Ed              Median nerve glides  NV  NT NT                                             Ther Ex              pulleys x3 min x3 min X5 min  Wall slides shoulder flexion          Bike  L1 x10' Bike x 10' Gentle X10' L1 X10' L1                 Shoulder isometrics, B Abd, ext 5x:10 Abd, ext 5x:10 Flexion - pain today. 5x:05, NV abd, and ext  Abd, ext 10x:05   TB B ER       Doorway vs corner pec stretch  5x:10 doorway 5x:10 doorway 5x:10 doorway 5x:10   Wall push ups 10x 20x 20x 20x          Standing hip abd 2x10 ea 2x10 2x10 2x10          Standing back bends 15x 15x with pillow 15x Doing at home   Squats  10x 10x 10x 10x                 Hip abd 40# 30x  40# 30x 40# 2x10 45# 30x   Ham curls 35# 30x 40# 30x 40# 2x10 35# 30x          Ther  Activity                     Gait Training                     Modalities       MH L shoulder  X 10' LB seated X10' post X10' post X10 post- LB

## 2024-11-04 ENCOUNTER — APPOINTMENT (OUTPATIENT)
Dept: PHYSICAL THERAPY | Age: 24
End: 2024-11-04
Payer: COMMERCIAL

## 2024-11-05 ENCOUNTER — APPOINTMENT (OUTPATIENT)
Dept: PHYSICAL THERAPY | Age: 24
End: 2024-11-05
Payer: COMMERCIAL

## 2024-11-07 ENCOUNTER — OFFICE VISIT (OUTPATIENT)
Dept: PHYSICAL THERAPY | Age: 24
End: 2024-11-07
Payer: COMMERCIAL

## 2024-11-07 DIAGNOSIS — M54.50 ACUTE BILATERAL LOW BACK PAIN WITHOUT SCIATICA: ICD-10-CM

## 2024-11-07 DIAGNOSIS — M75.02 ADHESIVE CAPSULITIS OF LEFT SHOULDER: ICD-10-CM

## 2024-11-07 DIAGNOSIS — M25.551 BILATERAL HIP PAIN: ICD-10-CM

## 2024-11-07 DIAGNOSIS — M77.52: Primary | ICD-10-CM

## 2024-11-07 DIAGNOSIS — M25.552 BILATERAL HIP PAIN: ICD-10-CM

## 2024-11-07 PROCEDURE — 97110 THERAPEUTIC EXERCISES: CPT | Performed by: PHYSICAL THERAPIST

## 2024-11-07 NOTE — PROGRESS NOTES
Daily Note     Today's date: 2024  Patient name: Ayesha Tomlinson  : 2000  MRN: [id number]  Referring provider: Brock Sampson PA*  Dx:   Encounter Diagnosis     ICD-10-CM    1. Adhesive capsulitis of ankle, left  M77.52       2. Acute bilateral low back pain without sciatica  M54.50       3. Bilateral hip pain  M25.551     M25.552       4. Adhesive capsulitis of left shoulder  M75.02                      Subjective: Patient reports that she is feeling better overall. Patient does feel increased back and shoulder pain today.       Objective: See treatment diary below      Assessment: Tolerated treatment well. Patient demonstrated fatigue post treatment, exhibited good technique with therapeutic exercises, would benefit from continued PT, and PT adding isometric shoulder adduction for shoulder strengthening. Continuing with current program, finishing with  for pain relief.       Plan: Continue per plan of care.      Precautions: PREGNANT   NO supine TE      Manuals 11/7 10/24 10/28 10/31                               Neuro Re-Ed                                                               Ther Ex              Wall slides shoulder flexion - x3 min X5 min  Wall slides shoulder flexion          Bike  L1 x10' Bike x 10' Gentle X10' L1 X10' L1                 Shoulder isometrics, B Abd, ext 5x:05, add  Abd, ext 5x:10 Flexion - pain today. 5x:05, NV abd, and ext  Abd, ext 10x:05   TB B ER       Doorway pec stretch  5x:10 doorway 5x:10 doorway 5x:10 doorway 5x:10   Wall push ups 20x 20x 20x 20x          Standing hip abd 2x10 ea 2x10 2x10 2x10          Standing back bends - 15x with pillow 15x Doing at home   Squats  10x 10x 10x 10x                 Hip abd 45# 30x  40# 30x 40# 2x10 45# 30x   Ham curls 35# 30x 40# 30x 40# 2x10 35# 30x          Ther Activity                     Gait Training                     Modalities        L shoulder  X 10' LB seated and L shoulder X10' post X10' post X10 post-  LB

## 2024-11-08 ENCOUNTER — APPOINTMENT (OUTPATIENT)
Dept: LAB | Facility: CLINIC | Age: 24
End: 2024-11-08
Payer: COMMERCIAL

## 2024-11-08 DIAGNOSIS — Z34.92 PRENATAL CARE IN SECOND TRIMESTER: ICD-10-CM

## 2024-11-08 LAB — GLUCOSE 1H P 50 G GLC PO SERPL-MCNC: 130 MG/DL (ref 70–134)

## 2024-11-08 PROCEDURE — 82950 GLUCOSE TEST: CPT

## 2024-11-08 PROCEDURE — 36415 COLL VENOUS BLD VENIPUNCTURE: CPT

## 2024-11-11 ENCOUNTER — ROUTINE PRENATAL (OUTPATIENT)
Dept: OBGYN CLINIC | Facility: CLINIC | Age: 24
End: 2024-11-11
Payer: COMMERCIAL

## 2024-11-11 VITALS — SYSTOLIC BLOOD PRESSURE: 116 MMHG | BODY MASS INDEX: 34.54 KG/M2 | WEIGHT: 195 LBS | DIASTOLIC BLOOD PRESSURE: 78 MMHG

## 2024-11-11 DIAGNOSIS — Z29.11 NEED FOR RSV VACCINATION: ICD-10-CM

## 2024-11-11 DIAGNOSIS — Z34.93 PRENATAL CARE IN THIRD TRIMESTER: Primary | ICD-10-CM

## 2024-11-11 DIAGNOSIS — Z3A.34 34 WEEKS GESTATION OF PREGNANCY: ICD-10-CM

## 2024-11-11 PROCEDURE — 99213 OFFICE O/P EST LOW 20 MIN: CPT | Performed by: OBSTETRICS & GYNECOLOGY

## 2024-11-11 PROCEDURE — 90471 IMMUNIZATION ADMIN: CPT

## 2024-11-11 PROCEDURE — 87150 DNA/RNA AMPLIFIED PROBE: CPT | Performed by: OBSTETRICS & GYNECOLOGY

## 2024-11-11 PROCEDURE — 90678 RSV VACC PREF BIVALENT IM: CPT

## 2024-11-11 RX ORDER — CALCIUM CARBONATE 500 MG/1
1 TABLET, CHEWABLE ORAL DAILY
COMMUNITY

## 2024-11-11 NOTE — PROGRESS NOTES
Patient reports good fm, no v,  bleeding, loss of fluid, dom violence,.  jalyn pnv occasional headache, occasional mild cramping, some pedal edema, still smoking.  Urine trace protein negative glucose.  -GBS done today  -RSV today, had Tdap and flu  -Continues desire [place] previously counseled briefly reviewed  -Already has breast pump and 30-week pack  -Pediatrician: [facility]  -Contraception declined  -Return in 1 week for labor talk or sooner as needed

## 2024-11-12 ENCOUNTER — APPOINTMENT (OUTPATIENT)
Dept: PHYSICAL THERAPY | Age: 24
End: 2024-11-12
Payer: COMMERCIAL

## 2024-11-13 LAB — GP B STREP DNA SPEC QL NAA+PROBE: NEGATIVE

## 2024-11-13 NOTE — PROGRESS NOTES
OB/GYN  PN Visit  Ayesha Tomlinson  [id number]  2024  12:24 PM  RAYSHAWN Sheffield      S: 24 y.o.  37w2d here for PN visit.   She denies contractions. She denies leakage of fluid and vaginal bleeding today. .   She endorses good fetal movement.   She endorses nausea (+), headache(+), cramping (+), edema (+), smoking (+).   Having headaches two times a week. Tylenol or sleep relieves headache. No blurry vision or dizziness or RUQ abdominal pain. Reports similar headaches throughout entire pregnancy.  No change in symptoms with headache.    Reports abdominal cramping worsening yesterday, reports history of intermittent cramping throughout pregnancy and yesterday had worsening pain with cramps occurring every two minutes x3, then symptoms resolved. Denies abdominal pain, cramping today. Has been using physical therapy to relieve back, shoulder and hip pain and reports after PT noticed underwear was wet and thought was leaking fluid reporting it was not urine.   Has increasing lower ext swelling since August. No rapid increase in LE swelling.   Smokes tobacco socially with partner daily.   Her pregnancy is complicated by history of LTCS, smoking and depression.     O:  Pre-Sabino Vitals      Flowsheet Row Most Recent Value   Prenatal Assessment    Prenatal Vitals    Blood Pressure 118/72   Weight - Scale 90.2 kg (198 lb 12.8 oz)   Urine Albumin/Glucose    Dilation/Effacement/Station    Vaginal Drainage    Edema           Wt=90.2 kg (198 lb 12.8 oz); Body mass index is 35.22 kg/m².; TWG=25.3 kg (55 lb 12.8 oz)  Physical Exam  General: Well appearing, no distress  Respiratory: Unlabored breathing  Cardiovascular: Regular rate.  Abdomen: Soft, gravid, nontender  Fundal Height: Appropriate for gestational age.  Extremities: Warm and well perfused.  Non tender.  OB exam completed: fundal height, +FHT.  Urine: trace protein/negative glucose    POCT: Reviewed with Dr. Correia.   Yeast - Positive  Clue  cells - Negative  Ferning- Negative  Nitrizine - Negative     A/P:  1. 37w2d GESTATION  - Continue PNV  - Labor precautions reviewed  - Fetal kick counts reviewed  - Labs: UTD  - GBS: negative 24  - Genetics: Materni 21 low risk. AFP negative. Never received SMA/CF.   - Ultrasounds: 10/15/24 normal growth EFW 68%  - Tdap: 24  - Flu Shot: 10/15/2024  - Rhogam: O+ rhogam not indicated   - Yellow packet and delivery consent: signed 10/16/24  - Delivery: TOLAC  - Contraception: declined  - Breastfeeding: breast, pump ordered   - Pediatrician: [facility]    Discussed case with Dr. Correia. Hyphae noted under microscope. Will treat with diflucan per Dr. Correia recommendation. No pooling noted, negative Nitrazine, and negative ferning.     Reviewed labor precautions and when to seek immediate attention. Discussed if experiencing contractions, decreased fetal movement, leakage of fluid or vaginal bleeding to notify office immediately.  Discussed if experiencing headache with blurry vision, dizziness, or abdominal pain to notify office immediately.   Reviewed fetal kick counts obtaining 10 kicks in two hours.   Advised to start perineal massage starting at 34 weeks to decrease the risk of vaginal tearing.   Advised to continue medications PNV and return in 1 weeks.    Problem List       Lactose intolerance    37 weeks gestation of pregnancy    History of  delivery, antepartum    Prenatal care in third trimester     Other Visit Diagnoses         Yeast infection                RAYSHAWN Sheffield  2024  12:24 PM

## 2024-11-15 ENCOUNTER — OFFICE VISIT (OUTPATIENT)
Dept: PHYSICAL THERAPY | Age: 24
End: 2024-11-15
Payer: COMMERCIAL

## 2024-11-15 DIAGNOSIS — M54.50 ACUTE BILATERAL LOW BACK PAIN WITHOUT SCIATICA: ICD-10-CM

## 2024-11-15 DIAGNOSIS — M77.52: Primary | ICD-10-CM

## 2024-11-15 PROCEDURE — 97110 THERAPEUTIC EXERCISES: CPT | Performed by: PHYSICAL THERAPIST

## 2024-11-15 NOTE — PROGRESS NOTES
Daily Note     Today's date: 11/15/2024  Patient name: Ayesha Tomlinson  : 2000  MRN: [id number]  Referring provider: Brock Sampson PA*  Dx:   Encounter Diagnosis     ICD-10-CM    1. Adhesive capsulitis of ankle, left  M77.52       2. Acute bilateral low back pain without sciatica  M54.50                      Subjective: Patient reports that she's having increased back pain, alleviated with standing flexion. Patient reports the shoulder pain and ROM is progressing and improving.       Objective: See treatment diary below      Assessment: Tolerated treatment well. PT progressed exercise program to include additional UE strengthening TE and continuing with current program. Finishing with  for back pain relief post TE. Patient moderately challenged with current exercise program.       Plan: Continue per plan of care.      Precautions: PREGNANT   NO supine TE      Manuals 11/7 11/15 10/28 10/31                               Neuro Re-Ed                                                               Ther Ex              Wall slides shoulder flexion - Pulleys x 5 min X5 min  Wall slides shoulder flexion          Bike  L1 x10' Bike x 10' L1 X10' L1 X10' L1                 Shoulder isometrics, B Abd, ext 5x:05, add  Abd, ext 5x:10 Flexion - pain today. 5x:05, NV abd, and ext  Abd, ext 10x:05   TB B ER       Doorway pec stretch  5x:10 doorway 5x:10 doorway 5x:10 doorway 5x:10   Wall push ups 20x 20x 20x 20x          Standing hip abd 2x10 ea 2x10 2x10 2x10          Standing back bends - - 15x Doing at home   Squats  10x 2x10 10x 10x                 Hip abd 45# 30x  45# 30x 40# 2x10 45# 30x   Ham curls 35# 30x 40# 30x 40# 2x10 35# 30x          Ther Activity                     Gait Training                     Modalities        L shoulder  X 10' LB seated and L shoulder X10' post X10' post X10 post- LB

## 2024-11-19 ENCOUNTER — OFFICE VISIT (OUTPATIENT)
Dept: PHYSICAL THERAPY | Age: 24
End: 2024-11-19
Payer: COMMERCIAL

## 2024-11-19 DIAGNOSIS — M25.552 BILATERAL HIP PAIN: ICD-10-CM

## 2024-11-19 DIAGNOSIS — M75.02 ADHESIVE CAPSULITIS OF LEFT SHOULDER: ICD-10-CM

## 2024-11-19 DIAGNOSIS — M25.551 BILATERAL HIP PAIN: ICD-10-CM

## 2024-11-19 DIAGNOSIS — M54.50 ACUTE BILATERAL LOW BACK PAIN WITHOUT SCIATICA: Primary | ICD-10-CM

## 2024-11-19 PROCEDURE — 97110 THERAPEUTIC EXERCISES: CPT

## 2024-11-19 NOTE — PROGRESS NOTES
Daily Note     Today's date: 2024  Patient name: Ayesha Tomlinson  : 2000  MRN: [id number]  Referring provider: Brock Sampson PA*  Dx:   Encounter Diagnosis     ICD-10-CM    1. Acute bilateral low back pain without sciatica  M54.50       2. Bilateral hip pain  M25.551     M25.552       3. Adhesive capsulitis of left shoulder  M75.02                      Subjective: Patient reports not having much of an issue lately.       Objective: See treatment diary below      Assessment: Tolerated treatment well. Patient exhibited good technique with therapeutic exercises      Plan: Continue per plan of care.      Precautions: PREGNANT   NO supine TE      Manuals 11/7 11/15 11/19                                Neuro Re-Ed                                                               Ther Ex              Wall slides shoulder flexion - Pulleys x 5 min Pulleys x5 min           Bike  L1 x10' Bike x 10' L1 Bike x10' L1                  Shoulder isometrics, B Abd, ext 5x:05, add  Abd, ext 5x:10 Abd, ext 5x:10    TB B ER       Doorway pec stretch  5x:10 doorway 5x:10 doorway 5x :10 doorway    Wall push ups 20x 20x 20x           Standing hip abd 2x10 ea 2x10 2x10           Standing back bends - -     Squats  10x 2x10 2x10                  Hip abd 45# 30x  45# 30x 45# 30x    Ham curls 35# 30x 40# 30x 40# 30x           Ther Activity                     Gait Training                     Modalities       MH L shoulder  X 10' LB seated and L shoulder X10' post

## 2024-11-20 ENCOUNTER — ROUTINE PRENATAL (OUTPATIENT)
Dept: OBGYN CLINIC | Facility: CLINIC | Age: 24
End: 2024-11-20
Payer: COMMERCIAL

## 2024-11-20 VITALS
SYSTOLIC BLOOD PRESSURE: 118 MMHG | WEIGHT: 198.8 LBS | DIASTOLIC BLOOD PRESSURE: 72 MMHG | HEIGHT: 63 IN | BODY MASS INDEX: 35.22 KG/M2

## 2024-11-20 DIAGNOSIS — Z34.93 PRENATAL CARE IN THIRD TRIMESTER: Primary | ICD-10-CM

## 2024-11-20 DIAGNOSIS — Z3A.37 37 WEEKS GESTATION OF PREGNANCY: ICD-10-CM

## 2024-11-20 DIAGNOSIS — B37.9 YEAST INFECTION: ICD-10-CM

## 2024-11-20 DIAGNOSIS — O34.219 HISTORY OF CESAREAN DELIVERY, ANTEPARTUM: ICD-10-CM

## 2024-11-20 PROCEDURE — 99214 OFFICE O/P EST MOD 30 MIN: CPT

## 2024-11-20 RX ORDER — FLUCONAZOLE 150 MG/1
150 TABLET ORAL ONCE
Qty: 1 TABLET | Refills: 0 | Status: SHIPPED | OUTPATIENT
Start: 2024-11-20 | End: 2024-11-20

## 2024-11-20 RX ORDER — FLUCONAZOLE 150 MG/1
150 TABLET ORAL ONCE
Qty: 1 TABLET | Refills: 0 | Status: SHIPPED | OUTPATIENT
Start: 2024-11-20 | End: 2024-11-20 | Stop reason: CLARIF

## 2024-11-26 ENCOUNTER — TELEPHONE (OUTPATIENT)
Dept: OBGYN CLINIC | Facility: CLINIC | Age: 24
End: 2024-11-26

## 2024-11-26 ENCOUNTER — ROUTINE PRENATAL (OUTPATIENT)
Dept: OBGYN CLINIC | Facility: CLINIC | Age: 24
End: 2024-11-26
Payer: COMMERCIAL

## 2024-11-26 VITALS
SYSTOLIC BLOOD PRESSURE: 126 MMHG | HEIGHT: 63 IN | WEIGHT: 200 LBS | BODY MASS INDEX: 35.44 KG/M2 | DIASTOLIC BLOOD PRESSURE: 88 MMHG

## 2024-11-26 DIAGNOSIS — O34.219 HISTORY OF CESAREAN DELIVERY, ANTEPARTUM: Primary | ICD-10-CM

## 2024-11-26 DIAGNOSIS — Z3A.37 37 WEEKS GESTATION OF PREGNANCY: ICD-10-CM

## 2024-11-26 PROCEDURE — 99213 OFFICE O/P EST LOW 20 MIN: CPT | Performed by: STUDENT IN AN ORGANIZED HEALTH CARE EDUCATION/TRAINING PROGRAM

## 2024-11-26 NOTE — PROGRESS NOTES
Silverio is a 25 yo  at 38 weeks and 1 day presenting for routine PNC- reports occasional CTX- denies any LOF or VB. Lost part of mucus plug.  Endorses good FM.  Pregnancy is complicated by h/o CD, BMI 35 and depression.  Patient desires  and risks benefits and alternatives were discussed- she prefers spontaneous labor. Will let us know at next visit up until which point and if she would like to consider IOL at any point.  Cervix closed/ -4, mid, soft.  Labor precautions reviewed- RTO in 1 week

## 2024-11-26 NOTE — TELEPHONE ENCOUNTER
Lmom asking pt to please come to appt on 11/26 at 1030am due to provider needing to be out of the office by 1045.

## 2024-12-02 ENCOUNTER — ROUTINE PRENATAL (OUTPATIENT)
Dept: OBGYN CLINIC | Facility: CLINIC | Age: 24
End: 2024-12-02
Payer: COMMERCIAL

## 2024-12-02 VITALS
WEIGHT: 199 LBS | DIASTOLIC BLOOD PRESSURE: 86 MMHG | HEIGHT: 63 IN | SYSTOLIC BLOOD PRESSURE: 120 MMHG | BODY MASS INDEX: 35.26 KG/M2

## 2024-12-02 DIAGNOSIS — O34.219 HISTORY OF CESAREAN DELIVERY, ANTEPARTUM: ICD-10-CM

## 2024-12-02 DIAGNOSIS — Z3A.39 39 WEEKS GESTATION OF PREGNANCY: Primary | ICD-10-CM

## 2024-12-02 PROCEDURE — 99213 OFFICE O/P EST LOW 20 MIN: CPT | Performed by: STUDENT IN AN ORGANIZED HEALTH CARE EDUCATION/TRAINING PROGRAM

## 2024-12-02 NOTE — ASSESSMENT & PLAN NOTE
Ayesha presents today for her 39 week visit. She is currently 39w0d.    Vitals:    12/02/24 0800   BP: 120/86       Ft/50/-3    I have reviewed the signs and symptoms of labor with the patient, including contractions q4-5 minutes for greater than 1 hour, vaginal bleeding, leaking fluid and decreased fetal movement.  I have emphasized the continued importance of paying close attention to the baby's movements.  I have instructed the patient to call the office with any of the above symptoms.

## 2024-12-02 NOTE — PROGRESS NOTES
Assessment/Plan  Problem List Items Addressed This Visit       39 weeks gestation of pregnancy - Primary      Ayesha presents today for her 39 week visit. She is currently 39w0d.    Vitals:    24 0800   BP: 120/86       Ft/50/-3    I have reviewed the signs and symptoms of labor with the patient, including contractions q4-5 minutes for greater than 1 hour, vaginal bleeding, leaking fluid and decreased fetal movement.  I have emphasized the continued importance of paying close attention to the baby's movements.  I have instructed the patient to call the office with any of the above symptoms.           History of  delivery, antepartum    Plans to TOLAC            Subjective    Ayesha is a 24 y.o. adult,  with an Estimated Date of Delivery: 24 with a current gestational age of 39w0d. Patient reports occasional cramping and nausea. Fetal movement: active.     History  The following portions of the patient's history were reviewed and updated as appropriate: allergies, current medications, past family history, past medical history, past social history, past surgical history and problem list.    Objective  Vitals:    24 0800   BP: 120/86     FHT: 150  FH: 40  Urine: neg/neg

## 2024-12-06 ENCOUNTER — NURSE TRIAGE (OUTPATIENT)
Age: 24
End: 2024-12-06

## 2024-12-06 ENCOUNTER — HOSPITAL ENCOUNTER (OUTPATIENT)
Facility: HOSPITAL | Age: 24
Discharge: HOME | End: 2024-12-06
Attending: OBSTETRICS & GYNECOLOGY | Admitting: OBSTETRICS & GYNECOLOGY
Payer: COMMERCIAL

## 2024-12-06 VITALS
TEMPERATURE: 98.4 F | RESPIRATION RATE: 18 BRPM | HEART RATE: 105 BPM | SYSTOLIC BLOOD PRESSURE: 115 MMHG | DIASTOLIC BLOOD PRESSURE: 66 MMHG

## 2024-12-06 PROBLEM — O36.8190 DECREASED FETAL MOVEMENT: Status: ACTIVE | Noted: 2024-12-06

## 2024-12-06 PROCEDURE — NC001 PR NO CHARGE: Performed by: OBSTETRICS & GYNECOLOGY

## 2024-12-06 PROCEDURE — 99213 OFFICE O/P EST LOW 20 MIN: CPT

## 2024-12-06 PROCEDURE — 76815 OB US LIMITED FETUS(S): CPT | Performed by: OBSTETRICS & GYNECOLOGY

## 2024-12-06 PROCEDURE — 76815 OB US LIMITED FETUS(S): CPT

## 2024-12-06 NOTE — TELEPHONE ENCOUNTER
Pt called back in 39w4d  stating she has only felt 2 movements in the last 3-4 hours. She had called in around 0840 with abdominal pain and decreased fetal movement, was advised to hydrate and do kick counts. She only felt one movement in those 2 hours. Pain has subsided. Had 2 contractions within the last hour. Denies any vaginal bleeding or LOF. Advised pt to be evaluated in L&D for decreased fetal movement. She verbalized understanding and  will go now.     [facility] sent to On Call Provider Dr. Mckeon and Dex L&D Charge RN

## 2024-12-06 NOTE — DISCHARGE INSTRUCTIONS
Pregnancy at 39 to 40 Weeks   WHAT YOU NEED TO KNOW:   What changes are happening with my body?  You are now getting close to your due date. Your due date is just an estimate of when your baby will be born. Your baby may be born before or after your due date. Your breathing may be easier if your baby has moved down into a head-down position. You may need to urinate more often because the baby may be pressing on your bladder. You may also feel more discomfort and tire easily. You may also be having trouble sleeping.  How do I care for myself at this stage of my pregnancy?       Eat a variety of healthy foods.  Healthy foods include fruits, vegetables, whole-grain breads, low-fat dairy foods, beans, lean meats, and fish. Drink liquids as directed. Ask how much liquid to drink each day and which liquids are best for you. Limit caffeine to less than 200 milligrams each day. Limit your intake of fish to 2 servings each week. Choose fish low in mercury such as canned light tuna, shrimp, salmon, cod, or tilapia. Do not  eat fish high in mercury such as swordfish, tilefish, jessie mackerel, and shark.         Take prenatal vitamins as directed.  Your need for certain vitamins and minerals, such as folic acid, increases during pregnancy. Prenatal vitamins provide some of the extra vitamins and minerals you need. Prenatal vitamins may also help to decrease the risk of certain birth defects.         Rest as needed.  Put your feet up if you have swelling in your ankles and feet.         Talk to your healthcare provider about exercise.  Moderate exercise can help you stay fit. Your healthcare provider will help you plan an exercise program that is safe for you during pregnancy.         Do not smoke.  Smoking increases your risk of a miscarriage and other health problems during your pregnancy. Smoking can cause your baby to be born early or weigh less at birth. Ask your healthcare provider for information if you need help  quitting.    Do not drink alcohol.  Alcohol passes from your body to your baby through the placenta. It can affect your baby's brain development and cause fetal alcohol syndrome (FAS). FAS is a group of conditions that causes mental, behavior, and growth problems.    Talk to your healthcare provider before you take any medicines.  Many medicines may harm your baby if you take them when you are pregnant. Do not take any medicines, vitamins, herbs, or supplements without first talking to your healthcare provider. Never use illegal or street drugs (such as marijuana or cocaine) while you are pregnant.    What are some safety tips during pregnancy?   Avoid hot tubs and saunas.  Do not use a hot tub or sauna while you are pregnant, especially during your first trimester. Hot tubs and saunas may raise your baby's temperature and increase the risk of birth defects.    Avoid toxoplasmosis.  This is an infection caused by eating raw meat or being around infected cat feces. It can cause birth defects, miscarriages, and other problems. Wash your hands after you touch raw meat. Make sure any meat is well-cooked before you eat it. Avoid raw eggs and unpasteurized milk. Use gloves or ask someone else to clean your cat's litter box while you are pregnant.         Ask your healthcare provider about travel.  The most comfortable time to travel is during the second trimester. Ask your provider if you can travel after 36 weeks. You may not be able to travel in an airplane after 36 weeks. He or she may also recommend that you avoid long road trips.    What changes are happening with my baby?  Your baby is ready to be born. At birth, your baby may weigh about 6 to 9 pounds and be about 19 to 21 inches long. Your baby may be in a head-down position. Your baby will also rest lower in your abdomen.  What do I need to know about prenatal care?  Your healthcare provider will check your blood pressure and weight. You may also need the  following:  A urine test  may also be done to check for sugar and protein. These can be signs of gestational diabetes or infection. Protein in your urine may also be a sign of preeclampsia. Preeclampsia is a condition that can develop during week 20 or later of your pregnancy. It causes high blood pressure, and it can cause problems with your kidneys and other organs.    A gestational diabetes screen  may be done. Your healthcare provider may order either a 1-step or 2-step oral glucose tolerance test (OGTT).     1-step OGTT:  Your blood sugar level will be tested after you have not eaten for 8 hours (fasting). You will then be given a glucose drink. Your level will be tested again 1 hour and 2 hours after you finish the drink.    2-step OGTT:  You do not have to fast for the first part of the test. You will have the glucose drink at any time of day. Your blood sugar level will be checked 1 hour later. If your blood sugar is higher than a certain level, another test will be ordered. You will fast and your blood sugar level will be tested. You will have the glucose drink. Your blood will be tested again 1 hour, 2 hours, and 3 hours after you finish the glucose drink.    Your baby's heart rate  will be checked.    When should I seek immediate care?   You develop a severe headache that does not go away.    You have new or increased vision changes, such as blurred or spotted vision.    You have new or increased swelling in your face or hands.    You have vaginal spotting or bleeding.    Your water broke or you feel warm water gushing or trickling from your vagina.    When should I call my obstetrician?   You have more than 5 contractions in 1 hour.    You notice any changes in your baby's movements.    You have abdominal cramps, pressure, or tightening.    You have a change in vaginal discharge.    You have chills or a fever.    You have vaginal itching, burning, or pain.    You have yellow, green, white, or  foul-smelling vaginal discharge.    You have pain or burning when you urinate, less urine than usual, or pink or bloody urine.    You have questions or concerns about your condition or care.    CARE AGREEMENT:   You have the right to help plan your care. Learn about your health condition and how it may be treated. Discuss treatment options with your healthcare providers to decide what care you want to receive. You always have the right to refuse treatment. The above information is an  only. It is not intended as medical advice for individual conditions or treatments. Talk to your doctor, nurse or pharmacist before following any medical regimen to see if it is safe and effective for you.  © Copyright [employer] 2022 Information is for End User's use only and may not be sold, redistributed or otherwise used for commercial purposes. All illustrations and images included in CareNotes® are the copyrighted property of [employer] or [employer]

## 2024-12-06 NOTE — PROGRESS NOTES
L&D Triage Note - OB/GYN  Ayesha Tomlinson 24 y.o. adult MRN: [id number]  Unit/Bed#: LD TRIAGE  Encounter: [id number]    ASSESSMENT  Ayesha Tomlinson is a 24 y.o.  at 39w4d presenting for decreased fetal movement since this morning. She reports return of movement while in triage, although it is still decreased. NST reactive. ISSAC wnl. Patient's SVE 1/30/-3. Patient to be discharged home with precautions to monitor baby's movements very closely. She will follow up with her obstetrician on .    PLAN  #1. DFM:   NST reactive  ISSAC 15.64 cm    #2. 39 weeks gestation:   SVE 1/30/-3  No ctx on toco    Discharge instructions  Patient instructed to call if experiencing worsening contractions, vaginal bleeding, loss of fluid or decreased fetal movement.  Will follow up with OBGYN on 2024. Patient is to arrive 30 minutes early for NST and ISSAC, per Dr. Mckeon    D/w Dr. Mckeon  ______________    SUBJECTIVE    TERESA: Estimated Date of Delivery: 24    HPI:  24 y.o.  39w4d presents with complaint of decreased fetal movement since this morning. She tried having food and sugary drink but reported minimal improvement. She endorses return of movement while in triage after having apple juice.    Contractions: denies  Leakage of fluid: denies  Vaginal Bleeding: denies  Fetal movement: present    Her obstetrical history is significant for C/Sx1     ROS:  Constitutional: Negative  Respiratory: Negative  Cardiovascular: Negative    Gastrointestinal: Negative    OBJECTIVE:    Vitals:  /66   Pulse 105   Temp 98.4 °F (36.9 °C) (Temporal)   Resp 18   LMP 2024 (Exact Date)   There is no height or weight on file to calculate BMI.    Physical Exam  Vitals and nursing note reviewed.   Constitutional:       General: Silverio is not in acute distress.     Appearance: Dom is well-developed.   HENT:      Head: Normocephalic and atraumatic.   Eyes:      Conjunctiva/sclera: Conjunctivae normal.    Cardiovascular:      Rate and Rhythm: Normal rate and regular rhythm.      Heart sounds: No murmur heard.  Pulmonary:      Effort: Pulmonary effort is normal. No respiratory distress.      Breath sounds: Normal breath sounds.   Abdominal:      Palpations: Abdomen is soft.      Tenderness: There is no abdominal tenderness.   Musculoskeletal:         General: No swelling.      Cervical back: Neck supple.   Skin:     General: Skin is warm and dry.      Capillary Refill: Capillary refill takes less than 2 seconds.   Neurological:      Mental Status: Dom is alert.   Psychiatric:         Mood and Affect: Mood normal.       SVE:  Method: Manual  OB Examiner: Rep  1/30/-3    FHT:  Baseline Rate (FHR): 150 bpm  Variability: Moderate  Accelerations: 15 x 15 or greater, With fetal movement  Decelerations: None    TOCO:   Contraction Frequency (minutes): 0    Labs: No results found for this or any previous visit (from the past 24 hours).      Juanita Yi MD  12/6/2024  1:58 PM

## 2024-12-06 NOTE — TELEPHONE ENCOUNTER
"39w4d [facility] 12/9/24 7:40 woke up with sudden intense mid abd pain, 5 minutes later had soft stool x2 w/ acid reflux x1. Stomach pain now constant 5/10.   [place] Baby moving this AM. Patient states no contractions, no uterine cramping.  Patient states she has had acid reflux entire pregnancy. No LOF, no bleeding, no vaginal discharge. No fever. Patient is able to tolerate sips of water without n/v. Advised to increase hydration with small frequent sips of cold water, ice chips or ice pops or electrolyte fluids. Call back if any s/s of contractions, lof, bleeding or  decreased fetal movements or any other concerning symptoms. Patient verbalzied understanding.   ESC Dr. Mckeon- reviewed.    . Reason for Disposition   Diarrhea or vomiting, questions about    Answer Assessment - Initial Assessment Questions  1. LOCATION: \"Where does it hurt?\"       Stomach pain is constant  2. RADIATION: \"Does the pain shoot anywhere else?\" (e.g., chest, back)      Abd pain  3. ONSET: \"When did the pain begin?\" (Minutes, hours or days ago)       0740AM  4. SUDDEN: \"Gradual or sudden onset?\"      sudden  5. PATTERN: \"Does the pain come and go, or has it been constant since it started?\"       constant  6. SEVERITY: \"How bad is the pain?\" \"What does it keep you from doing?\"  (e.g., Scale 1-10; mild, moderate, or severe)      5/10  7. RECURRENT SYMPTOM: \"Have you ever had this type of stomach pain before?\" If Yes, ask: \"When was the last time?\" and \"What happened that time?\"       no  8. CAUSE: \"What do you think is causing the stomach pain?      unsure  9. RELIEVING/AGGRAVATING FACTORS: \"What makes it better or worse?\" (e.g., antacids, bowel movement, movement)      Lessened with stool passing  10. FETAL MOVEMENT: \"Has the baby's movement decreased or changed significantly from normal?\"        Feeling baby move well   11. OTHER SYMPTOMS: \"Do you have any other symptoms?\" (e.g., back pain, contractions, diarrhea, fever, headache, urination pain, " "vaginal bleeding, vaginal discharge, vomiting)        Loose stool x 2  12. TERESA: \"What date are you expecting to deliver?\"        12/09/24    Protocols used: Pregnancy - Abdominal Pain Greater Than 20 Weeks EGA-Adult-[place]    "

## 2024-12-06 NOTE — PROCEDURES
Ayesha Tomlinson, a  at 39w4d with an TERESA of 2024, by Last Menstrual Period, was seen at [facility] LABOR AND DELIVERY for the following procedure(s): $Procedure Type: ISSAC]               4 Quadrant ISSAC  ISSAC Q1 (cm): 4.2 cm  ISSAC Q2 (cm): 3.1 cm  ISSAC Q3 (cm): 4.2 cm  ISSAC Q4 (cm): 4.1 cm  ISSAC TOTAL (cm): 15.6 cm    Juanita Yi  PGY-1 OB/GYN  24  4:08 PM

## 2024-12-09 ENCOUNTER — ULTRASOUND (OUTPATIENT)
Dept: OBGYN CLINIC | Facility: CLINIC | Age: 24
End: 2024-12-09
Payer: COMMERCIAL

## 2024-12-09 ENCOUNTER — TELEPHONE (OUTPATIENT)
Dept: OBGYN CLINIC | Facility: CLINIC | Age: 24
End: 2024-12-09

## 2024-12-09 VITALS
SYSTOLIC BLOOD PRESSURE: 114 MMHG | DIASTOLIC BLOOD PRESSURE: 64 MMHG | WEIGHT: 202 LBS | BODY MASS INDEX: 35.78 KG/M2 | HEART RATE: 113 BPM

## 2024-12-09 DIAGNOSIS — Z34.93 PRENATAL CARE IN THIRD TRIMESTER: Primary | ICD-10-CM

## 2024-12-09 LAB
DME PARACHUTE DELIVERY DATE ACTUAL: NORMAL
DME PARACHUTE DELIVERY DATE REQUESTED: NORMAL
DME PARACHUTE ITEM DESCRIPTION: NORMAL
DME PARACHUTE ORDER STATUS: NORMAL
DME PARACHUTE SUPPLIER NAME: NORMAL
DME PARACHUTE SUPPLIER PHONE: NORMAL

## 2024-12-09 PROCEDURE — 76815 OB US LIMITED FETUS(S): CPT | Performed by: OBSTETRICS & GYNECOLOGY

## 2024-12-09 PROCEDURE — 99214 OFFICE O/P EST MOD 30 MIN: CPT | Performed by: OBSTETRICS & GYNECOLOGY

## 2024-12-09 PROCEDURE — 59025 FETAL NON-STRESS TEST: CPT | Performed by: OBSTETRICS & GYNECOLOGY

## 2024-12-09 NOTE — TELEPHONE ENCOUNTER
Patient returned call. Reviewed/confirmed IOL and instructions as scheduled, post date, for 12/16/24 at 7am. Patient verbalized understanding and had no questions at this time. Aware to call office if any questions or concerns prior to scheduled delivery date.   Staff message sent to provider, pink sticky and calendar updated.

## 2024-12-09 NOTE — TELEPHONE ENCOUNTER
Post date IOL scheduled 12/16/24 at 7am.  Placed call to patient to review/confirm IOL details and instructions, left non detailed message on machine requesting return call to office.

## 2024-12-09 NOTE — TELEPHONE ENCOUNTER
----- Message from Sarina Mckeon MD sent at 12/9/2024 11:33 AM EST -----  Regarding: iol jalyn  Please set up one day IOL for post edc jalyn ryder/pit for around 41 weeks.  Would be around 1 week 12/16

## 2024-12-09 NOTE — PROGRESS NOTES
Patient reports good fm, no n, headache, bleeding, loss of fluid, dom violence.  jalyn pnv urine negative negative, some cramping edema and same smoking.  Patient is feeling baby moving better than when was checked out on L&D on L&D was 1/50/-3.   -NST ISSAC today 150s with accelerations and moderate variability no decelerations toco occasional  -ISSAC 20  -Reviewed with patient amenable to induction at 41 weeks will message staff to schedule  -Return in 1 week or sooner as needed

## 2024-12-10 ENCOUNTER — ANESTHESIA (INPATIENT)
Dept: LABOR AND DELIVERY | Facility: HOSPITAL | Age: 24
DRG: 540 | End: 2024-12-10
Payer: COMMERCIAL

## 2024-12-10 ENCOUNTER — TELEPHONE (OUTPATIENT)
Dept: OTHER | Facility: OTHER | Age: 24
End: 2024-12-10

## 2024-12-10 ENCOUNTER — HOSPITAL ENCOUNTER (INPATIENT)
Facility: HOSPITAL | Age: 24
LOS: 3 days | Discharge: HOME | DRG: 540 | End: 2024-12-13
Attending: OBSTETRICS & GYNECOLOGY | Admitting: STUDENT IN AN ORGANIZED HEALTH CARE EDUCATION/TRAINING PROGRAM
Payer: COMMERCIAL

## 2024-12-10 DIAGNOSIS — Z98.891 STATUS POST CESAREAN DELIVERY: Primary | ICD-10-CM

## 2024-12-10 PROBLEM — Z34.93 PRENATAL CARE IN THIRD TRIMESTER: Status: RESOLVED | Noted: 2024-10-14 | Resolved: 2024-12-10

## 2024-12-10 PROBLEM — O36.8190 DECREASED FETAL MOVEMENT: Status: RESOLVED | Noted: 2024-12-06 | Resolved: 2024-12-10

## 2024-12-10 LAB
ABO GROUP BLD: NORMAL
BASE EXCESS BLDCOA CALC-SCNC: -2.2 MMOL/L (ref 3–11)
BASE EXCESS BLDCOV CALC-SCNC: -2.6 MMOL/L (ref 1–9)
BLD GP AB SCN SERPL QL: NEGATIVE
ERYTHROCYTE [DISTWIDTH] IN BLOOD BY AUTOMATED COUNT: 13.2 % (ref 11.6–15.1)
HCO3 BLDCOA-SCNC: 24.5 MMOL/L (ref 17.3–27.3)
HCO3 BLDCOV-SCNC: 22.8 MMOL/L (ref 12.2–28.6)
HCT VFR BLD AUTO: 34.6 % (ref 36.5–46.1)
HGB BLD-MCNC: 12.4 G/DL (ref 12–15.4)
HOLD SPECIMEN: NORMAL
MCH RBC QN AUTO: 32.1 PG (ref 26.8–34.3)
MCHC RBC AUTO-ENTMCNC: 35.8 G/DL (ref 31.4–37.4)
MCV RBC AUTO: 90 FL (ref 82–98)
O2 CT VFR BLDCOA CALC: 10.8 ML/DL
OXYHGB MFR BLDCOA: 46.7 %
OXYHGB MFR BLDCOV: 74.1 %
PCO2 BLDCOA: 48.6 MM[HG] (ref 30–60)
PCO2 BLDCOV: 41.7 MM HG (ref 27–43)
PH BLDCOA: 7.32 [PH] (ref 7.23–7.43)
PH BLDCOV: 7.36 [PH] (ref 7.19–7.49)
PLATELET # BLD AUTO: 257 THOUSANDS/UL (ref 149–390)
PMV BLD AUTO: 10.2 FL (ref 8.9–12.7)
PO2 BLDCOA: 22.4 MM HG (ref 5–25)
PO2 BLDCOV: 33.4 MM HG (ref 15–45)
RBC # BLD AUTO: 3.86 MILLION/UL (ref 3.88–5.12)
RH BLD: POSITIVE
SAO2 % BLDCOV: 16.6 ML/DL
SPECIMEN EXPIRATION DATE: NORMAL
TREPONEMA PALLIDUM IGG+IGM AB [PRESENCE] IN SERUM OR PLASMA BY IMMUNOASSAY: NORMAL
WBC # BLD AUTO: 15.47 THOUSAND/UL (ref 4.31–10.16)

## 2024-12-10 PROCEDURE — 99213 OFFICE O/P EST LOW 20 MIN: CPT

## 2024-12-10 PROCEDURE — 86900 BLOOD TYPING SEROLOGIC ABO: CPT

## 2024-12-10 PROCEDURE — 86780 TREPONEMA PALLIDUM: CPT

## 2024-12-10 PROCEDURE — 85027 COMPLETE CBC AUTOMATED: CPT

## 2024-12-10 PROCEDURE — 86901 BLOOD TYPING SEROLOGIC RH(D): CPT

## 2024-12-10 PROCEDURE — 4A1H7CZ MONITORING OF PRODUCTS OF CONCEPTION, CARDIAC RATE, VIA NATURAL OR ARTIFICIAL OPENING: ICD-10-PCS | Performed by: STUDENT IN AN ORGANIZED HEALTH CARE EDUCATION/TRAINING PROGRAM

## 2024-12-10 PROCEDURE — NC001 PR NO CHARGE: Performed by: STUDENT IN AN ORGANIZED HEALTH CARE EDUCATION/TRAINING PROGRAM

## 2024-12-10 PROCEDURE — 4A1HXCZ MONITORING OF PRODUCTS OF CONCEPTION, CARDIAC RATE, EXTERNAL APPROACH: ICD-10-PCS | Performed by: STUDENT IN AN ORGANIZED HEALTH CARE EDUCATION/TRAINING PROGRAM

## 2024-12-10 PROCEDURE — 3E0E7GC INTRODUCTION OF OTHER THERAPEUTIC SUBSTANCE INTO PRODUCTS OF CONCEPTION, VIA NATURAL OR ARTIFICIAL OPENING: ICD-10-PCS | Performed by: STUDENT IN AN ORGANIZED HEALTH CARE EDUCATION/TRAINING PROGRAM

## 2024-12-10 PROCEDURE — 82805 BLOOD GASES W/O2 SATURATION: CPT | Performed by: STUDENT IN AN ORGANIZED HEALTH CARE EDUCATION/TRAINING PROGRAM

## 2024-12-10 PROCEDURE — 59514 CESAREAN DELIVERY ONLY: CPT | Performed by: STUDENT IN AN ORGANIZED HEALTH CARE EDUCATION/TRAINING PROGRAM

## 2024-12-10 PROCEDURE — 10H07YZ INSERTION OF OTHER DEVICE INTO PRODUCTS OF CONCEPTION, VIA NATURAL OR ARTIFICIAL OPENING: ICD-10-PCS | Performed by: STUDENT IN AN ORGANIZED HEALTH CARE EDUCATION/TRAINING PROGRAM

## 2024-12-10 PROCEDURE — 86850 RBC ANTIBODY SCREEN: CPT

## 2024-12-10 RX ORDER — ACETAMINOPHEN 325 MG/1
650 TABLET ORAL EVERY 6 HOURS SCHEDULED
Status: DISCONTINUED | OUTPATIENT
Start: 2024-12-12 | End: 2024-12-13 | Stop reason: HOSPADM

## 2024-12-10 RX ORDER — ACETAMINOPHEN 325 MG/1
650 TABLET ORAL EVERY 6 HOURS SCHEDULED
Status: COMPLETED | OUTPATIENT
Start: 2024-12-11 | End: 2024-12-11

## 2024-12-10 RX ORDER — METOCLOPRAMIDE HYDROCHLORIDE 5 MG/ML
5 INJECTION INTRAMUSCULAR; INTRAVENOUS EVERY 6 HOURS PRN
Status: ACTIVE | OUTPATIENT
Start: 2024-12-10 | End: 2024-12-11

## 2024-12-10 RX ORDER — CALCIUM CARBONATE 500 MG/1
1000 TABLET, CHEWABLE ORAL DAILY PRN
Status: DISCONTINUED | OUTPATIENT
Start: 2024-12-10 | End: 2024-12-13 | Stop reason: HOSPADM

## 2024-12-10 RX ORDER — KETOROLAC TROMETHAMINE 30 MG/ML
INJECTION, SOLUTION INTRAMUSCULAR; INTRAVENOUS AS NEEDED
Status: DISCONTINUED | OUTPATIENT
Start: 2024-12-10 | End: 2024-12-10

## 2024-12-10 RX ORDER — SODIUM CHLORIDE, SODIUM LACTATE, POTASSIUM CHLORIDE, CALCIUM CHLORIDE 600; 310; 30; 20 MG/100ML; MG/100ML; MG/100ML; MG/100ML
125 INJECTION, SOLUTION INTRAVENOUS CONTINUOUS
Status: DISCONTINUED | OUTPATIENT
Start: 2024-12-10 | End: 2024-12-13 | Stop reason: HOSPADM

## 2024-12-10 RX ORDER — HYDROMORPHONE HCL/PF 1 MG/ML
0.5 SYRINGE (ML) INJECTION EVERY 2 HOUR PRN
Refills: 0 | Status: DISCONTINUED | OUTPATIENT
Start: 2024-12-10 | End: 2024-12-13 | Stop reason: HOSPADM

## 2024-12-10 RX ORDER — ONDANSETRON 2 MG/ML
4 INJECTION INTRAMUSCULAR; INTRAVENOUS EVERY 6 HOURS PRN
Status: ACTIVE | OUTPATIENT
Start: 2024-12-10 | End: 2024-12-11

## 2024-12-10 RX ORDER — FENTANYL CITRATE/PF 50 MCG/ML
25 SYRINGE (ML) INJECTION
Status: DISCONTINUED | OUTPATIENT
Start: 2024-12-10 | End: 2024-12-13 | Stop reason: HOSPADM

## 2024-12-10 RX ORDER — DEXAMETHASONE SODIUM PHOSPHATE 10 MG/ML
INJECTION, SOLUTION INTRAMUSCULAR; INTRAVENOUS AS NEEDED
Status: DISCONTINUED | OUTPATIENT
Start: 2024-12-10 | End: 2024-12-10

## 2024-12-10 RX ORDER — HYDROMORPHONE HCL/PF 1 MG/ML
0.5 SYRINGE (ML) INJECTION
Status: DISCONTINUED | OUTPATIENT
Start: 2024-12-10 | End: 2024-12-13 | Stop reason: HOSPADM

## 2024-12-10 RX ORDER — LIDOCAINE HCL/EPINEPHRINE/PF 2%-1:200K
VIAL (ML) INJECTION AS NEEDED
Status: DISCONTINUED | OUTPATIENT
Start: 2024-12-10 | End: 2024-12-10

## 2024-12-10 RX ORDER — OXYTOCIN/RINGER'S LACTATE 30/500 ML
PLASTIC BAG, INJECTION (ML) INTRAVENOUS
Status: COMPLETED
Start: 2024-12-10 | End: 2024-12-10

## 2024-12-10 RX ORDER — OXYTOCIN/RINGER'S LACTATE 30/500 ML
62.5 PLASTIC BAG, INJECTION (ML) INTRAVENOUS CONTINUOUS
Status: ACTIVE | OUTPATIENT
Start: 2024-12-10 | End: 2024-12-11

## 2024-12-10 RX ORDER — NALOXONE HYDROCHLORIDE 0.4 MG/ML
0.1 INJECTION, SOLUTION INTRAMUSCULAR; INTRAVENOUS; SUBCUTANEOUS
Status: ACTIVE | OUTPATIENT
Start: 2024-12-10 | End: 2024-12-11

## 2024-12-10 RX ORDER — DOCUSATE SODIUM 100 MG/1
100 CAPSULE, LIQUID FILLED ORAL 2 TIMES DAILY
Status: DISCONTINUED | OUTPATIENT
Start: 2024-12-11 | End: 2024-12-13 | Stop reason: HOSPADM

## 2024-12-10 RX ORDER — ONDANSETRON 2 MG/ML
INJECTION INTRAMUSCULAR; INTRAVENOUS
Status: COMPLETED
Start: 2024-12-10 | End: 2024-12-10

## 2024-12-10 RX ORDER — TERBUTALINE SULFATE 1 MG/ML
INJECTION, SOLUTION SUBCUTANEOUS
Status: DISPENSED
Start: 2024-12-10 | End: 2024-12-11

## 2024-12-10 RX ORDER — SODIUM CHLORIDE, SODIUM LACTATE, POTASSIUM CHLORIDE, CALCIUM CHLORIDE 600; 310; 30; 20 MG/100ML; MG/100ML; MG/100ML; MG/100ML
125 INJECTION, SOLUTION INTRAVENOUS CONTINUOUS
Status: DISCONTINUED | OUTPATIENT
Start: 2024-12-10 | End: 2024-12-11

## 2024-12-10 RX ORDER — ONDANSETRON 2 MG/ML
INJECTION INTRAMUSCULAR; INTRAVENOUS AS NEEDED
Status: DISCONTINUED | OUTPATIENT
Start: 2024-12-10 | End: 2024-12-10

## 2024-12-10 RX ORDER — DIPHENHYDRAMINE HCL 25 MG
25 TABLET ORAL EVERY 6 HOURS PRN
Status: DISCONTINUED | OUTPATIENT
Start: 2024-12-10 | End: 2024-12-13 | Stop reason: HOSPADM

## 2024-12-10 RX ORDER — ENOXAPARIN SODIUM 100 MG/ML
40 INJECTION SUBCUTANEOUS DAILY
Status: DISCONTINUED | OUTPATIENT
Start: 2024-12-11 | End: 2024-12-13 | Stop reason: HOSPADM

## 2024-12-10 RX ORDER — OXYCODONE HYDROCHLORIDE 10 MG/1
10 TABLET ORAL EVERY 4 HOURS PRN
Status: DISCONTINUED | OUTPATIENT
Start: 2024-12-11 | End: 2024-12-13 | Stop reason: HOSPADM

## 2024-12-10 RX ORDER — BUPIVACAINE HYDROCHLORIDE 2.5 MG/ML
30 INJECTION, SOLUTION EPIDURAL; INFILTRATION; INTRACAUDAL ONCE AS NEEDED
Status: DISCONTINUED | OUTPATIENT
Start: 2024-12-10 | End: 2024-12-11

## 2024-12-10 RX ORDER — IBUPROFEN 600 MG/1
600 TABLET, FILM COATED ORAL EVERY 6 HOURS SCHEDULED
Status: DISCONTINUED | OUTPATIENT
Start: 2024-12-12 | End: 2024-12-11

## 2024-12-10 RX ORDER — ONDANSETRON 2 MG/ML
4 INJECTION INTRAMUSCULAR; INTRAVENOUS EVERY 6 HOURS PRN
Status: DISCONTINUED | OUTPATIENT
Start: 2024-12-10 | End: 2024-12-10

## 2024-12-10 RX ORDER — NALBUPHINE HYDROCHLORIDE 10 MG/ML
5 INJECTION INTRAMUSCULAR; INTRAVENOUS; SUBCUTANEOUS
Status: DISPENSED | OUTPATIENT
Start: 2024-12-10 | End: 2024-12-11

## 2024-12-10 RX ORDER — CEFAZOLIN SODIUM 2 G/50ML
2000 SOLUTION INTRAVENOUS ONCE
Status: COMPLETED | OUTPATIENT
Start: 2024-12-10 | End: 2024-12-10

## 2024-12-10 RX ORDER — OXYTOCIN/RINGER'S LACTATE 30/500 ML
PLASTIC BAG, INJECTION (ML) INTRAVENOUS CONTINUOUS PRN
Status: DISCONTINUED | OUTPATIENT
Start: 2024-12-10 | End: 2024-12-10

## 2024-12-10 RX ORDER — LIDOCAINE HYDROCHLORIDE AND EPINEPHRINE 15; 5 MG/ML; UG/ML
INJECTION, SOLUTION EPIDURAL
Status: COMPLETED | OUTPATIENT
Start: 2024-12-10 | End: 2024-12-10

## 2024-12-10 RX ORDER — OXYCODONE HYDROCHLORIDE 5 MG/1
5 TABLET ORAL EVERY 4 HOURS PRN
Refills: 0 | Status: DISCONTINUED | OUTPATIENT
Start: 2024-12-10 | End: 2024-12-13 | Stop reason: HOSPADM

## 2024-12-10 RX ORDER — ONDANSETRON 2 MG/ML
4 INJECTION INTRAMUSCULAR; INTRAVENOUS EVERY 8 HOURS PRN
Status: DISCONTINUED | OUTPATIENT
Start: 2024-12-11 | End: 2024-12-13 | Stop reason: HOSPADM

## 2024-12-10 RX ORDER — KETOROLAC TROMETHAMINE 30 MG/ML
15 INJECTION, SOLUTION INTRAMUSCULAR; INTRAVENOUS EVERY 6 HOURS
Status: DISCONTINUED | OUTPATIENT
Start: 2024-12-11 | End: 2024-12-11

## 2024-12-10 RX ORDER — MORPHINE SULFATE 0.5 MG/ML
INJECTION, SOLUTION EPIDURAL; INTRATHECAL; INTRAVENOUS AS NEEDED
Status: DISCONTINUED | OUTPATIENT
Start: 2024-12-10 | End: 2024-12-10

## 2024-12-10 RX ORDER — FENTANYL CITRATE 50 UG/ML
INJECTION, SOLUTION INTRAMUSCULAR; INTRAVENOUS AS NEEDED
Status: DISCONTINUED | OUTPATIENT
Start: 2024-12-10 | End: 2024-12-10

## 2024-12-10 RX ORDER — OXYTOCIN/RINGER'S LACTATE 30/500 ML
1-30 PLASTIC BAG, INJECTION (ML) INTRAVENOUS
Status: DISCONTINUED | OUTPATIENT
Start: 2024-12-10 | End: 2024-12-11

## 2024-12-10 RX ADMIN — DEXAMETHASONE SODIUM PHOSPHATE 10 MG: 10 INJECTION INTRAMUSCULAR; INTRAVENOUS at 21:41

## 2024-12-10 RX ADMIN — Medication 500 MG: at 21:39

## 2024-12-10 RX ADMIN — LIDOCAINE HYDROCHLORIDE AND EPINEPHRINE 5 ML: 20; 5 INJECTION, SOLUTION EPIDURAL; INFILTRATION; INTRACAUDAL; PERINEURAL at 21:37

## 2024-12-10 RX ADMIN — SODIUM CHLORIDE, SODIUM LACTATE, POTASSIUM CHLORIDE, AND CALCIUM CHLORIDE 125 ML/HR: .6; .31; .03; .02 INJECTION, SOLUTION INTRAVENOUS at 15:28

## 2024-12-10 RX ADMIN — MORPHINE SULFATE 3 MG: 0.5 INJECTION, SOLUTION EPIDURAL; INTRATHECAL; INTRAVENOUS at 22:03

## 2024-12-10 RX ADMIN — LIDOCAINE HYDROCHLORIDE AND EPINEPHRINE 5 ML: 20; 5 INJECTION, SOLUTION EPIDURAL; INFILTRATION; INTRACAUDAL; PERINEURAL at 21:41

## 2024-12-10 RX ADMIN — SODIUM CHLORIDE, SODIUM LACTATE, POTASSIUM CHLORIDE, AND CALCIUM CHLORIDE 125 ML/HR: .6; .31; .03; .02 INJECTION, SOLUTION INTRAVENOUS at 21:05

## 2024-12-10 RX ADMIN — KETOROLAC TROMETHAMINE 15 MG: 30 INJECTION, SOLUTION INTRAMUSCULAR; INTRAVENOUS at 22:34

## 2024-12-10 RX ADMIN — ONDANSETRON 4 MG: 2 INJECTION INTRAMUSCULAR; INTRAVENOUS at 21:25

## 2024-12-10 RX ADMIN — LIDOCAINE HYDROCHLORIDE AND EPINEPHRINE 5 ML: 20; 5 INJECTION, SOLUTION EPIDURAL; INFILTRATION; INTRACAUDAL; PERINEURAL at 21:33

## 2024-12-10 RX ADMIN — CEFAZOLIN SODIUM 2000 MG: 2 SOLUTION INTRAVENOUS at 21:32

## 2024-12-10 RX ADMIN — SODIUM CHLORIDE, SODIUM LACTATE, POTASSIUM CHLORIDE, AND CALCIUM CHLORIDE 125 ML/HR: .6; .31; .03; .02 INJECTION, SOLUTION INTRAVENOUS at 11:09

## 2024-12-10 RX ADMIN — LIDOCAINE HYDROCHLORIDE AND EPINEPHRINE 3 ML: 15; 5 INJECTION, SOLUTION EPIDURAL at 15:10

## 2024-12-10 RX ADMIN — Medication 2 MILLI-UNITS/MIN: at 14:00

## 2024-12-10 RX ADMIN — ROPIVACAINE HYDROCHLORIDE: 2 INJECTION, SOLUTION EPIDURAL; INFILTRATION at 15:10

## 2024-12-10 RX ADMIN — FENTANYL CITRATE 50 MCG: 50 INJECTION INTRAMUSCULAR; INTRAVENOUS at 21:35

## 2024-12-10 RX ADMIN — OXYTOCIN 62.5 MILLI-UNITS/MIN: 10 INJECTION INTRAVENOUS at 23:45

## 2024-12-10 RX ADMIN — SODIUM CHLORIDE, SODIUM LACTATE, POTASSIUM CHLORIDE, AND CALCIUM CHLORIDE 125 ML/HR: .6; .31; .03; .02 INJECTION, SOLUTION INTRAVENOUS at 18:04

## 2024-12-10 RX ADMIN — Medication 250 MILLI-UNITS/MIN: at 21:57

## 2024-12-10 RX ADMIN — OXYTOCIN 2 MILLI-UNITS/MIN: 10 INJECTION INTRAVENOUS at 14:00

## 2024-12-10 RX ADMIN — ONDANSETRON 4 MG: 2 INJECTION, SOLUTION INTRAMUSCULAR; INTRAVENOUS at 21:32

## 2024-12-10 NOTE — OB LABOR/OXYTOCIN SAFETY PROGRESS
Labor Progress Note - Ayesha Tomlinson 24 y.o. adult MRN: [id number]    Unit/Bed#: -01 Encounter: [id number]       Contraction Frequency (minutes): 6-8  Contraction Intensity: Mild/Moderate  Uterine Activity Characteristics: Irregular  Cervical Dilation: 2        Cervical Effacement: 70  Fetal Station: -3  Baseline Rate (FHR): 145 bpm  Fetal Heart Rate (FHT): 141 BPM  FHR Category: I               Vital Signs:   Vitals:    12/10/24 1037   BP: 119/70   Pulse: 88   Resp:    Temp:    SpO2:        Notes/comments:   PROCEDURE:  ANDRADE BALLOON PLACEMENT    A 24F andrade with a 30cc balloon was selected. SVE was performed and cervix was located. Andrade was introduced over sterile gloved hands. Balloon advanced through cervix beyond the internal cervical os. A small amount amount of sterile saline solution was instilled in the balloon to confirm placement. Placement was confirmed to be beyond the internal cervical os. A total of 60cc of sterile saline solution was placed into the balloon. Pt tolerated well. Instructions left with RN to place andrade to gentle traction. Notify MD when andrade dislodged. Plan for pitocin once FB is dislodged. Dr. Correia aware.         Eunice Estrada MD 12/10/2024 11:20 AM

## 2024-12-10 NOTE — H&P
H & P- Obstetrics   Ayesha Tomlinson 24 y.o. adult MRN: [id number]  Unit/Bed#: LD TRIAGE  Encounter: [id number]    Assessment: 24 y.o.  at 40w1d admitted for non-reactive NST at term  SVE: /-3  FHT: cat 1  Clinical EFW: 68% at 32w1d ; Cephalic confirmed by TAUS  GBS status: neg   Postpartum contraception plan: IUD    Plan:   History of  delivery, antepartum  Assessment & Plan  C/S at 2017    * 39 weeks gestation of pregnancy  Assessment & Plan  Admit to OBGYN   Clear liquid diet   F/u T&S, CBC, RPR   IVF LR 125cc/hr   Continuous fetal monitoring and tocometry   Analgesia at maternal request   Vertex by TAUS        Discussed case and plan w/ Dr. Correia    Chief Complaint: contractions    HPI: Ayesha Tomlinson is a 24 y.o.  with an TERESA of 2024, by Last Menstrual Period at 40w1d who is being admitted for induction of labor. She complains of uterine contractions, occurring every 5-7 minutes, has no LOF, and reports no VB. She states she has felt good FM.    Patient Active Problem List   Diagnosis    Lactose intolerance    39 weeks gestation of pregnancy    History of  delivery, antepartum    Prenatal care in third trimester    Decreased fetal movement       Baby complications/comments: none    Review of Systems   Constitutional:  Negative for chills and fever.   HENT:  Negative for ear pain and sore throat.    Eyes:  Negative for pain and visual disturbance.   Respiratory:  Negative for cough and shortness of breath.    Cardiovascular:  Negative for chest pain and palpitations.   Gastrointestinal:  Negative for abdominal pain and vomiting.   Genitourinary:  Negative for dysuria and hematuria.   Musculoskeletal:  Negative for arthralgias and back pain.   Skin:  Negative for color change and rash.   Neurological:  Negative for seizures and syncope.   All other systems reviewed and are negative.      OB Hx:  OB History    Para Term  AB Living   2 1 1   1   SAB  IAB Ectopic Multiple Live Births       1      # Outcome Date GA Lbr Marc/2nd Weight Sex Type Anes PTL Lv   2 Current            1 Term 17 40w5d  3215 g (7 lb 1.4 oz) F CS-LTranv Spinal  MICHI       Past Medical Hx:  Past Medical History:   Diagnosis Date    Allergic     Anemia     Anxiety     Depression     previously on Prozac - d/c age 11    Headache(784.0)     Kidney stone     Migraine     Varicella     vaccinated       Past Surgical hx:  Past Surgical History:   Procedure Laterality Date     SECTION  2017    CHOLECYSTECTOMY  2017       No Known Allergies      Medications Prior to Admission:     Acetaminophen (TYLENOL PO)    calcium carbonate (TUMS) 500 mg chewable tablet    Prenatal Vit-Fe Fumarate-FA (PRENATAL VITAMINS PO)    Objective:  Temp:  [98.3 °F (36.8 °C)] 98.3 °F (36.8 °C)  HR:  [109-111] 109  BP: (120)/(73) 120/73  Resp:  [18] 18  SpO2:  [97 %-98 %] 97 %  Body mass index is 35.8 kg/m².     Physical Exam:  Physical Exam  Constitutional:       Appearance: Normal appearance.   HENT:      Head: Atraumatic.   Eyes:      Extraocular Movements: Extraocular movements intact.      Conjunctiva/sclera: Conjunctivae normal.   Cardiovascular:      Rate and Rhythm: Normal rate and regular rhythm.      Pulses: Normal pulses.   Pulmonary:      Effort: Pulmonary effort is normal. No respiratory distress.      Breath sounds: Normal breath sounds.   Abdominal:      Palpations: Abdomen is soft.      Tenderness: There is no abdominal tenderness.   Neurological:      General: No focal deficit present.      Mental Status: Dom is alert and oriented to person, place, and time.   Skin:     General: Skin is warm and dry.   Psychiatric:         Mood and Affect: Mood normal.         Behavior: Behavior normal.   Vitals reviewed. Exam conducted with a chaperone present.            FHT:  Baseline Rate (FHR): 145 bpm  Variability: Moderate  Accelerations: 15 x 15 or greater, With fetal movement  Decelerations:  Early    TOCO:   Contraction Frequency (minutes): 6-8  Contraction Duration (seconds):   Contraction Intensity: Mild/Moderate    Lab Results   Component Value Date    WBC 12.48 (H) 09/12/2024    HGB 12.3 09/12/2024    HCT 35.3 (L) 09/12/2024     09/12/2024     Lab Results   Component Value Date    K 3.5 04/19/2024     04/19/2024    CO2 27 04/19/2024    BUN 11 04/19/2024    CREATININE 0.68 04/19/2024    AST 12 01/12/2023    ALT 11 01/12/2023     Prenatal Labs: Reviewed     Blood type: O pos  Antibody: neg  GBS: neg  HIV: neg  Rubella: neg  Syphilis IgM/IgG: neg  HBsAg: neg  HCAb: neg  Chlamydia: neg  Gonorrhea: neg  Diabetes 1 hour screen: 130  3 hour glucose: n/a  Platelets: 291  Hgb: 11.9  >2 Midnights  INPATIENT     Signature/Title: Juanita Yi MD  Date: 12/10/2024  Time: 10:19 AM

## 2024-12-10 NOTE — ASSESSMENT & PLAN NOTE
Continue routine post partum care       OEE237an, HgB 12.4 -> 10.4  Pain well controlled: tylenol/motrin scheduled, edwina prn  Lochia within normal limits: continue to monitor   OOB: as able, encourage ambulation  Passing flatus  Rogers in place-Plan void trial today  DVT ppx: SCD, Lovenox 40mg qd

## 2024-12-10 NOTE — ANESTHESIA PREPROCEDURE EVALUATION
Procedure:  LABOR ANALGESIA    Relevant Problems   GYN   (+) 39 weeks gestation of pregnancy        Physical Exam    Airway    Mallampati score: II  TM Distance: >3 FB  Neck ROM: full     Dental   No notable dental hx     Cardiovascular  Rhythm: regular, Rate: normal    Pulmonary   Breath sounds clear to auscultation    Other Findings  post-pubertal.      Anesthesia Plan  ASA Score- 2     Anesthesia Type- epidural with ASA Monitors.         Additional Monitors:     Airway Plan:            Plan Factors-Exercise tolerance (METS): >4 METS.    Chart reviewed.   Existing labs reviewed. Patient summary reviewed.    Patient is not a current smoker.              Induction-     Postoperative Plan-     Perioperative Resuscitation Plan - Level 1 - Full Code.       Informed Consent- Anesthetic plan and risks discussed with patient.  I personally reviewed this patient with the CRNA. Discussed and agreed on the Anesthesia Plan with the CRNA..

## 2024-12-10 NOTE — ANESTHESIA PROCEDURE NOTES
Epidural Block    Patient location during procedure: OB/L&D  Start time: 12/10/2024 3:09 PM  Reason for block: procedure for pain  Staffing  Performed by: Zayra Rashid MD  Authorized by: Zayra Rashid MD    Preanesthetic Checklist  Completed: patient identified, IV checked, site marked, risks and benefits discussed, surgical consent, monitors and equipment checked, pre-op evaluation and timeout performed  Epidural  Patient position: sitting  Prep: ChloraPrep  Sedation Level: no sedation  Patient monitoring: frequent blood pressure checks, continuous pulse oximetry and heart rate  Approach: midline  Location: lumbar, L3-4  Injection technique: EVERETT saline  Needle  Needle type: Tuohy   Needle gauge: 17 G  Needle insertion depth: 6 cm  Catheter type: multi-orifice  Catheter size: 19 G  Catheter at skin depth: 10 cm  Catheter securement method: stabilization device and clear occlusive dressing  Test dose: negativelidocaine-epinephrine (XYLOCAINE-MPF/EPINEPHRINE) 1.5 %-1:200,000 injection 3 mL - Epidural   3 mL - 12/10/2024 3:10:00 PM  Assessment  Sensory level: T10  Number of attempts: 1negative aspiration for CSF, negative aspiration for heme and no paresthesia on injection  patient tolerated the procedure well with no immediate complications  Additional Notes  Single pass smooth epidural placement, paresthesia with catheter insertion that immediately resolved

## 2024-12-10 NOTE — OB LABOR/OXYTOCIN SAFETY PROGRESS
Labor Progress Note - Ayesha Tomlinson 24 y.o. adult MRN: [id number]    Unit/Bed#: -01 Encounter: [id number]       Contraction Frequency (minutes): 6  Contraction Intensity: Moderate  Uterine Activity Characteristics: Irregular  Cervical Dilation: 5        Cervical Effacement: 70  Fetal Station: -3  Baseline Rate (FHR): 145 bpm  Fetal Heart Rate (FHT): 138 BPM  FHR Category: I               Vital Signs:   Vitals:    12/10/24 1140   BP: 118/70   Pulse: 94   Resp:    Temp:    SpO2:        Notes/comments:   Feeling well at this time.  She is feeling some irregular mild contractions.  SVE revealed that Rogers balloon is within the vagina.  Cervical check after as above.  Plan to start Pitocin.  Discussed amniotomy at next check and analgesic options.  Patient will continue to consider if she would like her epidural or not. Dr. Correia aware.     Eunice Estrada MD 12/10/2024 1:41 PM

## 2024-12-11 LAB
ERYTHROCYTE [DISTWIDTH] IN BLOOD BY AUTOMATED COUNT: 13.2 % (ref 11.6–15.1)
HCT VFR BLD AUTO: 29.9 % (ref 36.5–46.1)
HGB BLD-MCNC: 10.4 G/DL (ref 12–15.4)
MCH RBC QN AUTO: 31.7 PG (ref 26.8–34.3)
MCHC RBC AUTO-ENTMCNC: 34.8 G/DL (ref 31.4–37.4)
MCV RBC AUTO: 91 FL (ref 82–98)
PLATELET # BLD AUTO: 227 THOUSANDS/UL (ref 149–390)
PMV BLD AUTO: 10.1 FL (ref 8.9–12.7)
RBC # BLD AUTO: 3.28 MILLION/UL (ref 3.88–5.12)
WBC # BLD AUTO: 26.17 THOUSAND/UL (ref 4.31–10.16)

## 2024-12-11 PROCEDURE — 85027 COMPLETE CBC AUTOMATED: CPT

## 2024-12-11 PROCEDURE — 99024 POSTOP FOLLOW-UP VISIT: CPT | Performed by: PHYSICIAN ASSISTANT

## 2024-12-11 PROCEDURE — 94762 N-INVAS EAR/PLS OXIMTRY CONT: CPT

## 2024-12-11 RX ORDER — IBUPROFEN 600 MG/1
600 TABLET, FILM COATED ORAL EVERY 6 HOURS SCHEDULED
Status: DISCONTINUED | OUTPATIENT
Start: 2024-12-12 | End: 2024-12-13 | Stop reason: HOSPADM

## 2024-12-11 RX ADMIN — KETOROLAC TROMETHAMINE 15 MG: 30 INJECTION, SOLUTION INTRAMUSCULAR; INTRAVENOUS at 19:21

## 2024-12-11 RX ADMIN — NALBUPHINE HYDROCHLORIDE 5 MG: 10 INJECTION, SOLUTION INTRAMUSCULAR; INTRAVENOUS; SUBCUTANEOUS at 01:07

## 2024-12-11 RX ADMIN — ACETAMINOPHEN 650 MG: 325 TABLET, FILM COATED ORAL at 17:47

## 2024-12-11 RX ADMIN — FENTANYL CITRATE 25 MCG: 50 INJECTION INTRAMUSCULAR; INTRAVENOUS at 00:21

## 2024-12-11 RX ADMIN — FENTANYL CITRATE 25 MCG: 50 INJECTION INTRAMUSCULAR; INTRAVENOUS at 00:09

## 2024-12-11 RX ADMIN — ACETAMINOPHEN 650 MG: 325 TABLET, FILM COATED ORAL at 03:15

## 2024-12-11 RX ADMIN — DOCUSATE SODIUM 100 MG: 100 CAPSULE, LIQUID FILLED ORAL at 09:48

## 2024-12-11 RX ADMIN — ACETAMINOPHEN 650 MG: 325 TABLET, FILM COATED ORAL at 09:48

## 2024-12-11 RX ADMIN — NALBUPHINE HYDROCHLORIDE 5 MG: 10 INJECTION, SOLUTION INTRAMUSCULAR; INTRAVENOUS; SUBCUTANEOUS at 04:53

## 2024-12-11 RX ADMIN — SODIUM CHLORIDE, SODIUM LACTATE, POTASSIUM CHLORIDE, AND CALCIUM CHLORIDE 125 ML/HR: .6; .31; .03; .02 INJECTION, SOLUTION INTRAVENOUS at 01:09

## 2024-12-11 RX ADMIN — ENOXAPARIN SODIUM 40 MG: 40 INJECTION SUBCUTANEOUS at 12:30

## 2024-12-11 RX ADMIN — DOCUSATE SODIUM 100 MG: 100 CAPSULE, LIQUID FILLED ORAL at 17:47

## 2024-12-11 RX ADMIN — FENTANYL CITRATE 25 MCG: 50 INJECTION INTRAMUSCULAR; INTRAVENOUS at 00:26

## 2024-12-11 RX ADMIN — KETOROLAC TROMETHAMINE 15 MG: 30 INJECTION, SOLUTION INTRAMUSCULAR; INTRAVENOUS at 12:42

## 2024-12-11 RX ADMIN — KETOROLAC TROMETHAMINE 15 MG: 30 INJECTION, SOLUTION INTRAMUSCULAR; INTRAVENOUS at 04:53

## 2024-12-11 RX ADMIN — ACETAMINOPHEN 650 MG: 325 TABLET, FILM COATED ORAL at 22:43

## 2024-12-11 NOTE — PLAN OF CARE
Problem: POSTPARTUM  Goal: Experiences normal postpartum course  Description: INTERVENTIONS:  - Monitor maternal vital signs  - Assess uterine involution and lochia  Outcome: Progressing  Goal: Appropriate maternal -  bonding  Description: INTERVENTIONS:  - Identify family support  - Assess for appropriate maternal/infant bonding   -Encourage maternal/infant bonding opportunities  - Referral to  or  as needed  Outcome: Progressing  Goal: Establishment of infant feeding pattern  Description: INTERVENTIONS:  - Assess breast/bottle feeding  - Refer to lactation as needed  Outcome: Progressing  Goal: Incision(s), wounds(s) or drain site(s) healing without S/S of infection  Description: INTERVENTIONS  - Assess and document dressing, incision, wound bed, drain sites and surrounding tissue  - Provide patient and family education  Outcome: Progressing     Problem: Knowledge Deficit  Goal: Verbalizes understanding of labor plan  Description: Assess patient/family/caregiver's baseline knowledge level and ability to understand information.  Provide education via patient/family/caregiver's preferred learning method at appropriate level of understanding.     1. Provide teaching at level of understanding.  2. Provide teaching via preferred learning method(s).  2024 by Mao Dumont  Outcome: Completed  12/10/2024 1956 by Mao Dumont  Outcome: Progressing     Problem: Labor & Delivery  Goal: Manages discomfort  Description: Assess and monitor for signs and symptoms of discomfort.  Assess patient's pain level regularly and per hospital policy.  Administer medications as ordered. Support use of nonpharmacological methods to help control pain such as distraction, imagery, relaxation, and application of heat and cold.  Collaborate with interdisciplinary team and patient to determine appropriate pain management plan.    1. Include patient in decisions related to comfort.  2. Offer  non-pharmacological pain management interventions.  3. Report ineffective pain management to physician.  12/11/2024 0220 by Mao Dumont  Outcome: Completed  12/10/2024 1956 by Mao Dumont  Outcome: Progressing  Goal: Patient vital signs are stable  Description: 1. Assess vital signs - vaginal delivery.  12/11/2024 0220 by Mao Dumont  Outcome: Completed  12/10/2024 1956 by Mao Dumont  Outcome: Progressing

## 2024-12-11 NOTE — OB LABOR/OXYTOCIN SAFETY PROGRESS
Oxytocin Safety Progress Check Note - Ayesha Tomlinson 24 y.o. adult MRN: [id number]    Unit/Bed#: -01 Encounter: [id number]    Dose (acosta-units/min) Oxytocin: 6 acosta-units/min  Contraction Frequency (minutes): 2-4.5  Contraction Intensity: Moderate  Uterine Activity Characteristics: Irregular  Cervical Dilation: 5        Cervical Effacement: 70  Fetal Station: -1  Baseline Rate (FHR): 145 bpm  Fetal Heart Rate (FHT): 148 BPM  FHR Category: 2               Vital Signs:   Vitals:    12/10/24 1945   BP: 103/56   Pulse: 96   Resp:    Temp:    SpO2:        Notes/comments:   Assessed patient at bedside for FHR deceleration.  SVE unchanged at /-1.  IUPC was noted to be outside the vagina, and this was replaced and amnioinfusion reinitiated.  Contractions every 2-3 min.  IVF were bolused, and Pitocin was held.  There were 2 prolonged decelerations lasting 2 min and 3 min with return to baseline in between.  She subsequently had several late decelerations.  Will continue to monitor off Pitocin and assess fetal tolerance s/p these measures as outlined above.  Reviewed possibility of needing to proceed with  for fetal intolerance pending response.  Dr. Correia at bedside.      Ayanna Verma MD 12/10/2024 8:22 PM

## 2024-12-11 NOTE — PLAN OF CARE

## 2024-12-11 NOTE — ANESTHESIA POSTPROCEDURE EVALUATION
Post-Op Assessment Note    CV Status:  Stable  Pain Score: 0    Pain management: adequate       Mental Status:  Alert and awake   Hydration Status:  Euvolemic   PONV Controlled:  Controlled   Airway Patency:  Patent     Post Op Vitals Reviewed: Yes    No anethesia notable event occurred.    Staff: Anesthesiologist, CRNA           Last Filed PACU Vitals:  Vitals Value Taken Time   Temp 99    Pulse 108 12/10/24 2250   /57    Resp 18    SpO2 99 % 12/10/24 2250   Vitals shown include unfiled device data.    Modified Lalit:  No data recorded

## 2024-12-11 NOTE — PROGRESS NOTES
"Progress Note - OB/GYN  Ayesha Tomlinson 24 y.o. adult MRN: [id number]  Unit/Bed#:  307-01 Encounter: [id number]    Assessment and Plan     Ayesha Tomlinson is a patient of: [facility]. She is PPD# 1 s/p  repeat  section, low transverse incision  Recovering well and is stable       History of  delivery, antepartum  Assessment & Plan  C/S at 2017      * Status post  delivery  Assessment & Plan       Continue routine post partum care       [id number], HgB 12.4 -> 10.4  Pain well controlled: tylenol/motrin scheduled, edwina prn  Lochia within normal limits: continue to monitor   OOB: as able, encourage ambulation  Passing flatus  Soler in place-Plan void trial today  DVT ppx: SCD, Lovenox 40mg qd          Disposition    - Anticipate discharge home on PPD# 2-3      Subjective/Objective     Chief Complaint: Postpartum State     Subjective:    Ayesha Tomlinson is PPD/POD#1 s/p  repeat  section, low transverse incision who delivered late last night.  She has no current complaints other than fatigue.  Pain is well controlled at this point. She is recovering well and is stable.     Breastfeeding:  yes    Tolerating PO: yes  Nausea or Vomiting: no  Voiding: no-soler catheter still in place  Flatus: yes; has had no bowel movement.   Ambulating: no  Chest pain: no  Shortness of breath: no  Leg pain: no  Lochia: appropriate     Vitals:   /65 (BP Location: Right arm)   Pulse 98   Temp 98 °F (36.7 °C) (Oral)   Resp 18   Ht 5' 3\" (1.6 m)   Wt 91.7 kg (202 lb 1.6 oz)   LMP 2024 (Exact Date)   SpO2 96%   Breastfeeding Yes   BMI 35.80 kg/m²       Intake/Output Summary (Last 24 hours) at 2024 0709  Last data filed at 2024 0600  Gross per 24 hour   Intake 3900 ml   Output 3276 ml   Net 624 ml       Invasive Devices       Peripheral Intravenous Line  Duration             Peripheral IV 12/10/24 Right;Ventral (anterior) Wrist <1 day              Drain  Duration "             Urethral Catheter Non-latex 16 Fr. <1 day              Intrauterine Pressure Catheter  Duration             Intrauterine Pressure Catheter 12/10/24 1718 <1 day                    Physical Exam:   GEN: Ayesha Tomlinson appears well, alert and oriented x 3, pleasant and cooperative   CARDIO: RRR, no murmurs or rubs  RESP:  CTAB, no wheezes or rales  ABDOMEN: soft, no tenderness, no distention, fundus @ , Incision with Mepilex dressing present  EXTREMITIES: SCDs on      Labs:     Hemoglobin   Date Value Ref Range Status   12/11/2024 10.4 (L) 12.0 - 15.4 g/dL Final   12/10/2024 12.4 12.0 - 15.4 g/dL Final     WBC   Date Value Ref Range Status   12/11/2024 26.17 (H) 4.31 - 10.16 Thousand/uL Final   12/10/2024 15.47 (H) 4.31 - 10.16 Thousand/uL Final     Platelets   Date Value Ref Range Status   12/11/2024 227 149 - 390 Thousands/uL Final   12/10/2024 257 149 - 390 Thousands/uL Final     Creatinine   Date Value Ref Range Status   04/19/2024 0.68 0.60 - 1.30 mg/dL Final     Comment:     Standardized to IDMS reference method   01/12/2023 0.64 0.50 - 0.96 mg/dL Final   05/09/2022 0.77 0.40 - 1.10 mg/dL Final   10/14/2021 0.76 0.40 - 1.10 mg/dL Final     AST   Date Value Ref Range Status   01/12/2023 12 10 - 30 U/L Final   05/09/2022 12 <41 U/L Final   10/14/2021 9 <41 U/L Final     ALT   Date Value Ref Range Status   01/12/2023 11 6 - 29 U/L Final   05/09/2022 20 <56 U/L Final   10/14/2021 18 <56 U/L Final          Margy Dent PA-C  12/11/2024  7:09 AM

## 2024-12-11 NOTE — OP NOTE
Section Operative Report - OB/GYN   Ayesha Tomlinson 24 y.o. adult MRN: [id number]  Unit/Bed#: LD PACU- Encounter: [id number]    Indications: non-reassuring fetal status    Pre-operative Diagnosis:   1. 40 week 1 day pregnancy  2. Cat II FHT remote from delivery   3. History of prior CD     Post-operative Diagnosis: same, delivered    Surgeon: Paula oCrreia MD    Assistant(s): Ayanna Verma     Findings:  1. Delivery of viable female on 12/10/24 at , weight 3605g;  Apgar scores of 9 at one minute and 9 at five minutes.   2. Normal appearing placenta with 3 vessel cord  3. Clear amniotic fluid  4. Grossly normal uterus, tubes, and ovaries    Specimens:   1. Arterial and venous cord gases  2. Cord blood  3. Segment of umbilical cord  4. Placenta to storage     Estimated Blood Loss:   426  mL           Total IV Fluids:   Intake/Output Summary (Last 24 hours) at 12/10/2024 2259  Last data filed at 12/10/2024 2235  Gross per 24 hour   Intake 3000 ml   Output 2226 ml   Net 774 ml         Drains: Rogers catheter           Complications:  None; patient tolerated the procedure well.           Disposition: PACU  and hemodynamically stable           Condition: stable    Procedure Details   Decision was made to proceed with  section due to persistent cat II FHT unresponsive to resuscitative measures. Patient was made aware of these findings and the proposed plan. Risks, benefits, possible complications, alternate treatment options, and expected outcomes were discussed with the patient.  The patient agreed with the proposed plan and gave informed consent.      The patient was taken to the operating room where she was properly identified to the OR staff and attending physician. She had already received epidural anesthesia during her labor course, which was augmented appropriately preoperatively.  Fetal heart tones were appreciated and found to be appropriate. SCDs were placed.  The abdomen was prepped  with Chloraprep and following appropriate drying time, the patient was draped in the usual sterile manner for a Pfannenstiel incision.  The patient had received Ancef 2g IV and azithromycin 500mg IV pre-operatively for prophylaxis.  A Time Out was held and the above information confirmed.  The patient was identified as Ayesha Tomlinson and the procedure verified as  Delivery.    A Pfannenstiel incision was made and carried down through the underlying subcutaneous tissue to the fascia using a scalpel and bovie. Rectus fascia was then incised in the midline and extended laterally using Campos scissors. The superior edge of the fascia was grasped with Kocher clamps, tented upward, and the underlying muscle was bluntly dissected off. The inferior edge was grasped with Kocher clamps and cleared in similar fashion.  All anatomic layers were well-demarcated.    The rectus muscles were  and the peritoneum was identified and subsequently entered and extended longitudinally with blunt dissection. The vesicouterine peritoneum was identified and a bladder flap was created using Metzenbaum scissors. The bladder blade was inserted.      A low transverse uterine incision was made with the scalpel and extended laterally with blunt dissection. The amnion was entered bluntly. Surgeons hand was inserted through the hysterotomy and the fetal head was palpated, elevated, and delivered through the uterine incision with the assistance of fundal pressure.  Baby had spontaneous cry with good color and tone.  The umbilical cord was clamped and cut.  The infant was handed off to the  providers.  Arterial and venous cord gases, cord blood, and a segment of umbilical cord were obtained for evaluation and promptly sent to the lab. The placenta delivered spontaneously with uterine fundal massage and was noted to have a 3 vessel cord. This was also sent to the lab for placental pathology.     The uterus was exteriorized  and a moist lap sponge was used to clear the cavity of clots and products of conception. A small right uterine extension was noted. The uterine incision was closed with a running locked suture of 0 Vicryl. A second layer of 0 monocryl was used to imbricate the first.  Good hemostasis was confirmed upon uterine closure. The uterus was returned to the abdomen.  The paracolic gutters were inspected and cleared of all clots and debris with moist lap sponges.  The fascia was closed with two running sutures of 0 Vicryl.  Subcutaneous tissues were closed with 2-0 Monocryl suture. The skin was closed with 4-0 Monocryl in a subcuticular fashion.  Sterile dressing was applied and an abdominal binder was then placed.     At the conclusion of the procedure, all needle, sponge, and instrument counts were noted to be correct x2.  The patient tolerated the procedure well and was transferred to her the recovery room in stable condition.

## 2024-12-11 NOTE — DISCHARGE SUMMARY
Discharge Summary - OB/GYN   Ayesha Tomlinson 24 y.o. adult MRN: [id number]  Unit/Bed#: LD PACU-01 Encounter: [id number]      Admission Date: 12/10/2024     Discharge Date: 24     Admitting Diagnosis:   Pregnancy at 40w1d  History of prior  x1    Discharge Diagnosis:   Same, delivered      Procedures: repeat  section, low transverse incision    Admitting Attending: Paula Correia MD    Delivering Attending: Dr. Correia    Discharging Attending: Dr. Dickerson    [facility] Course:   Ayesha Tomlinson is a 24 y.o.  at 40w1d who was initially admitted for IOL for non-reassuring FHT and desire for TOLAC.  On admission, SVE was 1/70/-3, and she was induced with soler balloon.  She progressed to 5/70/-3.  Pitocin was started, and she received an epidural for pain control.  Spontaneous rupture of membranes occurred for clear fluid.  IUPC was placed for initiation of amnioinfusion for recurrent variable decelerations.  Pitocin was held without return of category 1 FHT.  The decision was made to proceed with repeat  due to fetal intolerance / category 2 FHT.    She delivered a viable female  on 12/10/24 at 2156. Weight 7lbs 15.2oz via repeat  section, low transverse incision. Apgars were 9 (1 min) and 9 (5 min).  was transferred to  nursery. Patient tolerated the procedure well and was transferred to recovery in stable condition.     Her post-operative course was uncomplicated. Preoperative hemoglobin was 12.4, postoperative was 10.4. Her postoperative pain was well controlled with oral analgesics.    On day of discharge, she was ambulating and able to reasonably perform all ADLs. She was voiding and had appropriate bowel function. Pain was well controlled. She was discharged home on post-operative day #3 without complications. Patient was instructed to follow up with her OB as an outpatient and was given appropriate warnings to call provider if she  develops signs of infection or uncontrolled pain.    Complications: none apparent    Condition at discharge: good     Discharge instructions/Information to patient and family:   See after visit summary for information provided to patient and family.      Provisions for Follow-Up Care:  See after visit summary for information related to follow-up care and any pertinent home health orders.      Disposition: Home    Planned Readmission: No    Discharge Medications:  For a complete list of the patient's medications, please refer to her med rec.    Juanita Yi  PGY-1 OB/GYN  12/13/24  6:17 AM

## 2024-12-11 NOTE — OB LABOR/OXYTOCIN SAFETY PROGRESS
Labor Progress Note - Ayesha Tomlinson 24 y.o. adult MRN: [id number]    Unit/Bed#: LD  Encounter: [id number]    Dose (acosta-units/min) Oxytocin: 0 acosta-units/min (per dr ej gallo)  Contraction Frequency (minutes): 2-5.5  Contraction Intensity: Moderate  Uterine Activity Characteristics: Irregular  Cervical Dilation: 5        Cervical Effacement: 70  Fetal Station: -1  Baseline Rate (FHR): 145 bpm  Fetal Heart Rate (FHT): 140 BPM  FHR Category: II, intermittent late and variable decels                Vital Signs:   Vitals:    12/10/24 2035   BP: 107/52   Pulse:    Resp:    Temp:    SpO2:        Notes/comments: Patient examined at 1730 due to recurrent variable decels. IUPC placed and amnioinfusion started with appropriate response to resuscitation.     Patient noted to have prolonged decel at . Patient repositioned and pitocin held, fluid bolus administered. Patient then noted to have recurrent late decels. Discussed with patient if persistent decelerations are noted and we are unable to titrate pitocin appropriately to continue her induction,  would be recommended. Patient then repositioned to maternal left. FHR cat II, overall reassuring with moderate variability. Will continue to closely monitor      Paula Correia MD 12/10/2024 8:57 PM

## 2024-12-11 NOTE — LACTATION NOTE
This note was copied from a baby's chart.  CONSULT - LACTATION  Baby Girl (Janette Tomlinson 1 days female MRN: [id number]    [facility] AN NURSERY Room / Bed: (N)/ 307(N) Encounter: [id number]    Maternal Information     MOTHER:  Silverio Tomlinson  Maternal Age: 24 y.o.  OB History: # 1 - Date: 17, Sex: Female, Weight: 3215 g (7 lb 1.4 oz), GA: 40w5d, Type: , Low Transverse, Apgar1: 9, Apgar5: 9, Living: Living, Birth Comments: None    # 2 - Date: 12/10/24, Sex: Female, Weight: 3605 g (7 lb 15.2 oz), GA: 40w1d, Type: , Low Transverse, Apgar1: 9, Apgar5: 9, Living: Living, Birth Comments: None   Previouse breast reduction surgery? No    Lactation history:   Has patient previously breast fed: Yes   How long had patient previously breast fed: 6 months   Previous breast feeding complications: None     Past Surgical History:   Procedure Laterality Date     SECTION  2017    CHOLECYSTECTOMY  2017       Birth information:  YOB: 2026   Time of birth: 9:56 PM   Sex: female   Delivery type: , Low Transverse   Birth Weight: 3605 g (7 lb 15.2 oz)   Percent of Weight Change: 0%     Gestational Age: 40w1d   [unfilled]    Assessment     Breast and nipple assessment: flat nipple on left to start    [place] Assessment: normal assessment    Feeding assessment: feeding well with guidance     LATCH:  Latch: Repeated attempts, hold nipple in mouth, stimulate to suck (better on 2nd breast)   Audible Swallowing: A few with stimulation   Type of Nipple: Everted (After stimulation)   Comfort (Breast/Nipple): Soft/non-tender   Hold (Positioning): Partial assist, teach one side, mother does other, staff holds   LATCH Score: 7          Feeding recommendations:  breast feed on demand      In to see Family with some experience. Education on positioning and alignment. Mom is encouraged to:     - Bring baby up to the breast (use of  pillows to elevate so baby's torso is against mom's breasts)   - Skin to skin for feedings with top hand exposed to show signs of satiation   - Chin deep into breast tissue (make baby look up to the nipple)   - nose aligned to the nipple   -Wait for wide gape, drag chin on the breast so nipple is aimed at the upper, back palate  - Cheek should be touching breast   - Deep, firm hold of baby with ear, shoulder, hip alignment    Mom chose to start on left breast using football hold. A few short latches with Piston sucks. Then to right breast using football hold. Worked on positioning infant up at chest level and starting to feed infant with nose arriving at the nipple. Then, using areolar compression to achieve a deep latch that is comfortable and exchanges optimum amounts of milk. Guided dyad to deep latch converting to rocker suckling. Taught to use breast compressions & stimulation to keep rocker suckling. Nursing parent  was able to note signs of a good feeding like: less discomfort after latch on tenderness, good rocker suckling with stimulation, breast softening; rounded nipple and relaxed tone after nursing at breast.  Family also shown signs of good latch /feed.       12/11/24 1420   Breasts/Nipples   Left Breast Soft   Right Breast Soft   Left Nipple Flat;Everted   Right Nipple Everted   Intervention Other (comment)  (Overview Good latch/feed & support available)   Breastfeeding Status Yes   Breastfeeding Progress Not yet established   Breast Pump   Pump 3  (has Medela coming from Insurance)   Patient Follow-Up   Lactation Consult Status 2   Follow-Up Type Inpatient;Call as needed   Other OB Lactation Documentation    Additional Problem Noted helped with positioning/maintaining deep latch  (Encouraged Lactation support as needed)     Encouraged parents to call for assistance, questions, and concerns about breastfeeding.       Vianca Cruz RN 12/11/2024 3:21 PM

## 2024-12-12 PROCEDURE — 99024 POSTOP FOLLOW-UP VISIT: CPT | Performed by: OBSTETRICS & GYNECOLOGY

## 2024-12-12 RX ADMIN — DOCUSATE SODIUM 100 MG: 100 CAPSULE, LIQUID FILLED ORAL at 17:31

## 2024-12-12 RX ADMIN — IBUPROFEN 600 MG: 600 TABLET ORAL at 23:28

## 2024-12-12 RX ADMIN — ACETAMINOPHEN 650 MG: 325 TABLET, FILM COATED ORAL at 04:24

## 2024-12-12 RX ADMIN — ACETAMINOPHEN 650 MG: 325 TABLET, FILM COATED ORAL at 23:28

## 2024-12-12 RX ADMIN — DOCUSATE SODIUM 100 MG: 100 CAPSULE, LIQUID FILLED ORAL at 09:22

## 2024-12-12 RX ADMIN — ACETAMINOPHEN 650 MG: 325 TABLET, FILM COATED ORAL at 17:31

## 2024-12-12 RX ADMIN — ACETAMINOPHEN 650 MG: 325 TABLET, FILM COATED ORAL at 11:36

## 2024-12-12 RX ADMIN — IBUPROFEN 600 MG: 600 TABLET ORAL at 17:31

## 2024-12-12 RX ADMIN — IBUPROFEN 600 MG: 600 TABLET ORAL at 06:48

## 2024-12-12 RX ADMIN — ENOXAPARIN SODIUM 40 MG: 40 INJECTION SUBCUTANEOUS at 11:39

## 2024-12-12 RX ADMIN — IBUPROFEN 600 MG: 600 TABLET ORAL at 11:36

## 2024-12-12 RX ADMIN — OXYCODONE HYDROCHLORIDE 5 MG: 5 TABLET ORAL at 15:38

## 2024-12-12 RX ADMIN — IBUPROFEN 600 MG: 600 TABLET ORAL at 00:17

## 2024-12-12 NOTE — PROGRESS NOTES
"Progress Note - OB/GYN  Ayesha Tomlinson 24 y.o. adult MRN: [id number]  Unit/Bed#:  307-01 Encounter: [id number]    Assessment and Plan:   Ayesha Tomlinson is a patient of: [facility] . She is PPD# 2 s/p  primary  section, low transverse incision and  indication: failed TOLAC   Recovering well and is stable.     History of  delivery, antepartum  Assessment & Plan  C/S at 2017      * Status post  delivery  Assessment & Plan       Continue routine post partum care       NOT843yo, HgB 12.4 -> 10.4  Pain well controlled: tylenol/motrin scheduled, edwina prn  Lochia within normal limits: continue to monitor   OOB: as able, encourage ambulation  Passing flatus  Rogers in place-Plan void trial today  DVT ppx: SCD, Lovenox 40mg qd           Disposition    - Anticipate discharge home on PPD# 3-4  Barriers to discharge: None     Tere Villaplando MD  OBGYN PGY-1  2024 6:37 AM    Subjective/Objective     Chief Complaint: Postpartum State     Subjective:    Ayesha Tomlinson is PPD#2 s/p  repeat  section, low transverse incision. She has no current complaints and had no acute events overnight.  Pain is well controlled.  Patient is currently voiding.  She is ambulating.  Patient is currently passing flatus and has had no bowel movement. She is tolerating PO, and denies nausea or vomitting. Patient denies fever, chills, chest pain, shortness of breath, or calf tenderness. Lochia is normal. She is  Breastfeeding. She is recovering well and is stable.       Vitals:   /52 (BP Location: Right arm)   Pulse 85   Temp 97.8 °F (36.6 °C) (Oral)   Resp 18   Ht 5' 3\" (1.6 m)   Wt 91.7 kg (202 lb 1.6 oz)   LMP 2024 (Exact Date)   SpO2 98%   Breastfeeding Yes   BMI 35.80 kg/m²       Intake/Output Summary (Last 24 hours) at 2024 0637  Last data filed at 2024 1745  Gross per 24 hour   Intake --   Output 950 ml   Net -950 ml       Physical Exam:   GEN: " Ayesha Tomlinson appears well, alert and oriented x 3, pleasant and cooperative   CARDIO: RRR, intact distals pulses  RESP:  non-labored breathing  ABDOMEN: soft, no tenderness, fundus below umbilicus, Incision C/D/I  EXTREMITIES: Non tender, no erythema    Labs:   Recent Results (from the past 72 hours)   Type and screen    Collection Time: 12/10/24 10:58 AM   Result Value Ref Range    ABO Grouping O     Rh Factor Positive     Antibody Screen Negative     Specimen Expiration Date 20241213    L&D GREEN / YELLOW ON HOLD    Collection Time: 12/10/24 10:58 AM   Result Value Ref Range    Extra Tube Hold for add-ons.    CBC    Collection Time: 12/10/24 10:58 AM   Result Value Ref Range    WBC 15.47 (H) 4.31 - 10.16 Thousand/uL    RBC 3.86 (L) 3.88 - 5.12 Million/uL    Hemoglobin 12.4 12.0 - 15.4 g/dL    Hematocrit 34.6 (L) 36.5 - 46.1 %    MCV 90 82 - 98 fL    MCH 32.1 26.8 - 34.3 pg    MCHC 35.8 31.4 - 37.4 g/dL    RDW 13.2 11.6 - 15.1 %    Platelets 257 149 - 390 Thousands/uL    MPV 10.2 8.9 - 12.7 fL   RPR-Syphilis Screening (Total Syphilis IGG/IGM)    Collection Time: 12/10/24 10:58 AM   Result Value Ref Range    Syphilis Total Antibody Non-reactive Non-Reactive   CORD, Blood gas, venous    Collection Time: 12/10/24  9:57 PM   Result Value Ref Range    pH, Cord Jovanny 7.355 7.190 - 7.490    pCO2, Cord Jovanny 41.7 27.0 - 43.0 mm HG    pO2, Cord Jovanny 33.4 15.0 - 45.0 mm HG    HCO3, Cord Jovanny 22.8 12.2 - 28.6 mmol/L    Base Exc, Cord Jovanny -2.6 (L) 1.0 - 9.0 mmol/L    O2 Cont, Cord Jovanny 16.6 mL/dL    O2 HGB,VENOUS CORD 74.1 %   CORD, Blood gas, arterial    Collection Time: 12/10/24  9:57 PM   Result Value Ref Range    pH, Cord Art 7.320 7.230 - 7.430    pCO2, Cord Art 48.6 30.0 - 60.0    pO2, Cord Art 22.4 5.0 - 25.0 mm HG    HCO3, Cord Art 24.5 17.3 - 27.3 mmol/L    Base Exc, Cord Art -2.2 (L) 3.0 - 11.0 mmol/L    O2 Content, Cord Art 10.8 ml/dl    O2 Hgb, Arterial Cord 46.7 %   CBC    Collection Time: 12/11/24  5:54 AM    Result Value Ref Range    WBC 26.17 (H) 4.31 - 10.16 Thousand/uL    RBC 3.28 (L) 3.88 - 5.12 Million/uL    Hemoglobin 10.4 (L) 12.0 - 15.4 g/dL    Hematocrit 29.9 (L) 36.5 - 46.1 %    MCV 91 82 - 98 fL    MCH 31.7 26.8 - 34.3 pg    MCHC 34.8 31.4 - 37.4 g/dL    RDW 13.2 11.6 - 15.1 %    Platelets 227 149 - 390 Thousands/uL    MPV 10.1 8.9 - 12.7 fL       Meds:      Current Facility-Administered Medications:     acetaminophen (TYLENOL) tablet 650 mg, 650 mg, Oral, Q6H ARNULFO, Ayanna Verma MD, 650 mg at 12/12/24 0424    calcium carbonate (TUMS) chewable tablet 1,000 mg, 1,000 mg, Oral, Daily PRN, Ayanna Verma MD    diphenhydrAMINE (BENADRYL) tablet 25 mg, 25 mg, Oral, Q6H PRN, Ayanna Verma MD    docusate sodium (COLACE) capsule 100 mg, 100 mg, Oral, BID, Ayanna Verma MD, 100 mg at 12/11/24 1747    enoxaparin (LOVENOX) subcutaneous injection 40 mg, 40 mg, Subcutaneous, Daily, Ayanna Verma MD, 40 mg at 12/11/24 1230    fentaNYL (SUBLIMAZE) injection 25 mcg, 25 mcg, Intravenous, Q3 min PRN, Li Song CRNA, 25 mcg at 12/11/24 0026    HYDROmorphone (DILAUDID) injection 0.5 mg, 0.5 mg, Intravenous, Q2H PRN, Ayanna Verma MD    HYDROmorphone (DILAUDID) injection 0.5 mg, 0.5 mg, Intravenous, Q10 Min PRN, Li Song CRNA    ibuprofen (MOTRIN) tablet 600 mg, 600 mg, Oral, Q6H ARNULFO, Kusum Ramsey MD, 600 mg at 12/12/24 0017    lactated ringers infusion, 125 mL/hr, Intravenous, Continuous, Ayanna Verma MD, Stopped at 12/11/24 0900    ondansetron (ZOFRAN) injection 4 mg, 4 mg, Intravenous, Q8H PRN, Ayanna Verma MD    oxyCODONE (ROXICODONE) immediate release tablet 10 mg, 10 mg, Oral, Q4H PRN, Ayanna Verma MD    oxyCODONE (ROXICODONE) IR tablet 5 mg, 5 mg, Oral, Q4H PRN, Ayanna Verma MD

## 2024-12-12 NOTE — LACTATION NOTE
This note was copied from a baby's chart.  CONSULT - LACTATION  Baby Girl (Janette Tomlinson 2 days female MRN: [id number]    [facility] AN NURSERY Room / Bed: (N)/(N) Encounter: [id number]    Maternal Information     MOTHER:  Silverio Tomlinson  Maternal Age: 24 y.o.  OB History: # 1 - Date: 17, Sex: Female, Weight: 3215 g (7 lb 1.4 oz), GA: 40w5d, Type: , Low Transverse, Apgar1: 9, Apgar5: 9, Living: Living, Birth Comments: None    # 2 - Date: 12/10/24, Sex: Female, Weight: 3605 g (7 lb 15.2 oz), GA: 40w1d, Type: , Low Transverse, Apgar1: 9, Apgar5: 9, Living: Living, Birth Comments: None   Previouse breast reduction surgery? No    Lactation history:   Has patient previously breast fed: Yes   How long had patient previously breast fed: 6 weeks   Previous breast feeding complications: Other (Comment) (Had gallbladder procedure that interfered w/ milk supply)     Past Surgical History:   Procedure Laterality Date     SECTION  2017    CHOLECYSTECTOMY  2017       Birth information:  YOB: 2026   Time of birth: 9:56 PM   Sex: female   Delivery type: , Low Transverse   Birth Weight: 3605 g (7 lb 15.2 oz)   Percent of Weight Change: -5%     Gestational Age: 40w1d   [unfilled]    Assessment     Breast and nipple assessment: normal assessment flat nipples, arslan with nipples. Breasts are starting to fill, lumpy upon palipations     Assessment: normal assessment    Feeding assessment: feeding well, with stimulation  LATCH:  Latch: Grasps breast, tongue down, lips flanged, rhythmic sucking   Audible Swallowing: Spontaneous and intermittent (24 hours old)   Type of Nipple: Everted (After stimulation)   Comfort (Breast/Nipple): Soft/non-tender   Hold (Positioning): Partial assist, teach one side, mother does other, staff holds   LATCH Score: 9       Feeding recommendations:  breast feed on demand every 2-3  hours, watching for early feeding cues. At least 8-12 feedings in 24 hours.       12/12/24 8835   Lactation Consultation   Reason for Consult 20 m;20 min   Maternal Information   Has mother  before? Yes   How long did the the mother previously breastfeed? 6 weeks   Previous Maternal Breastfeeding Challenges Other (Comment)  (Had gallbladder procedure that interfered w/ milk supply)   Infant to breast within first hour of birth? No   Breastfeeding Delayed Due to Maternal status   Exclusive Pump and Bottle Feed No   LATCH Documentation   Latch 2   Audible Swallowing 2   Type of Nipple 2   Comfort (Breast/Nipple) 2   Hold (Positioning) 1   LATCH Score 9   Having latch problems? No   Position(s) Used Football   Breasts/Nipples   Left Breast Soft;Filling   Right Breast Soft;Filling   Left Nipple Flat;Everted   Right Nipple Flat;Everted   Breastfeeding Status Yes   Breastfeeding Progress Not yet established;Breastfeeding well   Patient Follow-Up   Lactation Consult Status 2   Follow-Up Type Inpatient;Call as needed   Other OB Lactation Documentation    Additional Problem Noted F/U: Assisted with bringing baby to breast. Patient's breasts are filling, audible swallows. Baby  for 15+ minutes on L, enc to switch R when baby slows down.     Assisted in waking and latching baby at the breast. Patient states that she feels a strong but comfortable tugging sensation when baby is sucking. Strong jaw movements and audible swallows noted. Reviewed non-nutritive vs nutritive sucking at the breast.    Feeding plan:  Family is encouraged to breastfeed on demand, every 2-3 hours or when baby showing early feeding cues.  Discussed the importance of ensuring that baby feeds 8-12x in 24 hours and not waiting over 3 hours to feed. Encouraged patient to offer both breasts, multiple times in a feed. Reiterated to watch the baby for hunger and fullness cues. Reviewed how to know when baby is full at the breast.    Discussed  different waking techniques to help a sleepy baby. Encouraged to unswaddle baby before bringing baby to breast. Encouraged to check baby's diaper, sitting baby upright and burping them. Placing baby skin to skin prior to feedings. Encouraged to stimulate baby's fingers, toes, ears, and chin while they are feeding on the breast to keep them actively sucking. Switching breasts and positions to extend baby's feed and keep baby stimulated. Hand expressing drops of colostrum to help baby wake.    Discussed cluster feeding and need to feed on demand to promote supply and prevent excessive weight loss. Reviewed cluster feeding is a normal baby behavior.     Encouraged family to monitor baby's outputs, ensuring baby is reaching goal diapers. Discussed that soiled diapers transition by changing color from black meconium to yellow/seedy by day 5.    Discussed initial engorgement time frame. Reviewed that breast milk will start to transition day 3-5.    Parents were encouraged to contact lactation for continued support and/or questions that arise.      Comfort Cisneros 12/12/2024 2:25 PM

## 2024-12-12 NOTE — LACTATION NOTE
This note was copied from a baby's chart.     12/12/24 1600   Lactation Consultation   Reason for Consult 10 m   Maternal Information   Has mother  before? Yes   How long did the the mother previously breastfeed? 6 weeks   Previous Maternal Breastfeeding Challenges Other (Comment)  (Had a gallbladder procedure that interfered w/ milk supply)   Infant to breast within first hour of birth? No   Breastfeeding Delayed Due to Maternal status   Exclusive Pump and Bottle Feed No   LATCH Documentation   Latch 2   Audible Swallowing 2   Type of Nipple 2   Comfort (Breast/Nipple) 2   Hold (Positioning) 2   LATCH Score 10   Having latch problems? No   Position(s) Used Football   Breasts/Nipples   Left Breast Soft;Filling   Right Breast Soft;Filling   Left Nipple Flat;Everted  (Milk bleb)   Right Nipple Flat;Everted   Intervention Lanolin;Hand expression   Breastfeeding Status Yes   Breastfeeding Progress Not yet established;Breastfeeding well   Patient Follow-Up   Lactation Consult Status 2   Follow-Up Type Inpatient;Call as needed   Other OB Lactation Documentation    Additional Problem Noted F/U: Patient called lactation to assess nipple. Milk bleb on L side. Education provided, advised to avoid picking at it.       Parents were encouraged to contact lactation for a latch assessment, continued support and/or questions that arise.

## 2024-12-12 NOTE — PLAN OF CARE
Problem: POSTPARTUM  Goal: Experiences normal postpartum course  Description: INTERVENTIONS:  - Monitor maternal vital signs  - Assess uterine involution and lochia  Outcome: Progressing  Goal: Appropriate maternal -  bonding  Description: INTERVENTIONS:  - Identify family support  - Assess for appropriate maternal/infant bonding   -Encourage maternal/infant bonding opportunities  - Referral to  or  as needed  Outcome: Progressing  Goal: Establishment of infant feeding pattern  Description: INTERVENTIONS:  - Assess breast/bottle feeding  - Refer to lactation as needed  Outcome: Progressing  Goal: Incision(s), wounds(s) or drain site(s) healing without S/S of infection  Description: INTERVENTIONS  - Assess and document dressing, incision, wound bed, drain sites and surrounding tissue  - Provide patient and family education  - Perform skin care  Outcome: Progressing     Problem: PAIN - ADULT  Goal: Verbalizes/displays adequate comfort level or baseline comfort level  Description: Interventions:  - Encourage patient to monitor pain and request assistance  - Assess pain using appropriate pain scale  - Administer analgesics based on type and severity of pain and evaluate response  - Implement non-pharmacological measures as appropriate and evaluate response  - Consider cultural and social influences on pain and pain management  - Notify physician/advanced practitioner if interventions unsuccessful or patient reports new pain  Outcome: Progressing     Problem: INFECTION - ADULT  Goal: Absence or prevention of progression during hospitalization  Description: INTERVENTIONS:  - Assess and monitor for signs and symptoms of infection  - Monitor lab/diagnostic results  - Monitor all insertion sites, i.e. indwelling lines, tubes, and drains  - Monitor endotracheal if appropriate and nasal secretions for changes in amount and color  - [place] appropriate cooling/warming therapies per order  -  Both Arterial and Venous pacer sheath sutured/secured in place and dressed with gauze and Tegaderms. Administer medications as ordered  - Instruct and encourage patient and family to use good hand hygiene technique  - Identify and instruct in appropriate isolation precautions for identified infection/condition  Outcome: Progressing  Goal: Absence of fever/infection during neutropenic period  Description: INTERVENTIONS:  - Monitor WBC    Outcome: Progressing     Problem: SAFETY ADULT  Goal: Patient will remain free of falls  Description: INTERVENTIONS:  - Educate patient/family on patient safety including physical limitations  - Instruct patient to call for assistance with activity   - Consult OT/PT to assist with strengthening/mobility   - Keep Call bell within reach  - Keep bed low and locked with side rails adjusted as appropriate  - Keep care items and personal belongings within reach  - Initiate and maintain comfort rounds  - Make Fall Risk Sign visible to staff  - Offer Toileting  Outcome: Progressing  Goal: Maintain or return to baseline ADL function  Description: INTERVENTIONS:  -  Assess patient's ability to carry out ADLs; assess patient's baseline for ADL function and identify physical deficits which impact ability to perform ADLs (bathing, care of mouth/teeth, toileting, grooming, dressing, etc.)  - Assess/evaluate cause of self-care deficits   - Assess range of motion  - Assess patient's mobility; develop plan if impaired  - Assess patient's need for assistive devices and provide as appropriate  - Encourage maximum independence but intervene and supervise when necessary  - Involve family in performance of ADLs  - Assess for home care needs following discharge   - Consider OT consult to assist with ADL evaluation and planning for discharge  - Provide patient education as appropriate  Outcome: Progressing     Problem: DISCHARGE PLANNING  Goal: Discharge to home or other facility with appropriate resources  Description: INTERVENTIONS:  - Identify barriers to discharge w/patient and caregiver  - Arrange for  needed discharge resources and transportation as appropriate  - Identify discharge learning needs (meds, wound care, etc.)  - Arrange for interpretive services to assist at discharge as needed  - Refer to Case Management Department for coordinating discharge planning if the patient needs post-hospital services based on physician/advanced practitioner order or complex needs related to functional status, cognitive ability, or social support system  Outcome: Progressing     Problem: Knowledge Deficit  Goal: Patient/family/caregiver demonstrates understanding of disease process, treatment plan, medications, and discharge instructions  Description: Complete learning assessment and assess knowledge base.  Interventions:  - Provide teaching at level of understanding  - Provide teaching via preferred learning methods  Outcome: Progressing

## 2024-12-12 NOTE — PLAN OF CARE
Problem: POSTPARTUM  Goal: Experiences normal postpartum course  Description: INTERVENTIONS:  - Monitor maternal vital signs  - Assess uterine involution and lochia  Outcome: Progressing  Goal: Appropriate maternal -  bonding  Description: INTERVENTIONS:  - Identify family support  - Assess for appropriate maternal/infant bonding   -Encourage maternal/infant bonding opportunities  - Referral to  or  as needed  Outcome: Progressing  Goal: Establishment of infant feeding pattern  Description: INTERVENTIONS:  - Assess breast/bottle feeding  - Refer to lactation as needed  Outcome: Progressing  Goal: Incision(s), wounds(s) or drain site(s) healing without S/S of infection  Description: INTERVENTIONS  - Assess and document dressing, incision, wound bed, drain sites and surrounding tissue  - Provide patient and family education  - Perform skin care  Outcome: Progressing     Problem: PAIN - ADULT  Goal: Verbalizes/displays adequate comfort level or baseline comfort level  Description: Interventions:  - Encourage patient to monitor pain and request assistance  - Assess pain using appropriate pain scale  - Administer analgesics based on type and severity of pain and evaluate response  - Implement non-pharmacological measures as appropriate and evaluate response  - Consider cultural and social influences on pain and pain management  - Notify physician/advanced practitioner if interventions unsuccessful or patient reports new pain  Outcome: Progressing     Problem: INFECTION - ADULT  Goal: Absence or prevention of progression during hospitalization  Description: INTERVENTIONS:  - Assess and monitor for signs and symptoms of infection  - Monitor lab/diagnostic results  - Monitor all insertion sites, i.e. indwelling lines, tubes, and drains  - Monitor endotracheal if appropriate and nasal secretions for changes in amount and color  - [place] appropriate cooling/warming therapies per order  -  Administer medications as ordered  - Instruct and encourage patient and family to use good hand hygiene technique  - Identify and instruct in appropriate isolation precautions for identified infection/condition  Outcome: Progressing  Goal: Absence of fever/infection during neutropenic period  Description: INTERVENTIONS:  - Monitor WBC    Outcome: Progressing     Problem: SAFETY ADULT  Goal: Patient will remain free of falls  Description: INTERVENTIONS:  - Educate patient/family on patient safety including physical limitations  - Instruct patient to call for assistance with activity   - Consult OT/PT to assist with strengthening/mobility   - Keep Call bell within reach  - Keep bed low and locked with side rails adjusted as appropriate  - Keep care items and personal belongings within reach  - Initiate and maintain comfort rounds  - Make Fall Risk Sign visible to staff  - Offer Toileting  Outcome: Progressing  Goal: Maintain or return to baseline ADL function  Description: INTERVENTIONS:  -  Assess patient's ability to carry out ADLs; assess patient's baseline for ADL function and identify physical deficits which impact ability to perform ADLs (bathing, care of mouth/teeth, toileting, grooming, dressing, etc.)  - Assess/evaluate cause of self-care deficits   - Assess range of motion  - Assess patient's mobility; develop plan if impaired  - Assess patient's need for assistive devices and provide as appropriate  - Encourage maximum independence but intervene and supervise when necessary  - Involve family in performance of ADLs  - Assess for home care needs following discharge   - Consider OT consult to assist with ADL evaluation and planning for discharge  - Provide patient education as appropriate  Outcome: Progressing     Problem: DISCHARGE PLANNING  Goal: Discharge to home or other facility with appropriate resources  Description: INTERVENTIONS:  - Identify barriers to discharge w/patient and caregiver  - Arrange for  needed discharge resources and transportation as appropriate  - Identify discharge learning needs (meds, wound care, etc.)  - Arrange for interpretive services to assist at discharge as needed  - Refer to Case Management Department for coordinating discharge planning if the patient needs post-hospital services based on physician/advanced practitioner order or complex needs related to functional status, cognitive ability, or social support system  Outcome: Progressing     Problem: Knowledge Deficit  Goal: Patient/family/caregiver demonstrates understanding of disease process, treatment plan, medications, and discharge instructions  Description: Complete learning assessment and assess knowledge base.  Interventions:  - Provide teaching at level of understanding  - Provide teaching via preferred learning methods  Outcome: Progressing

## 2024-12-12 NOTE — PLAN OF CARE

## 2024-12-13 VITALS
HEIGHT: 63 IN | OXYGEN SATURATION: 98 % | WEIGHT: 202.1 LBS | TEMPERATURE: 98.2 F | DIASTOLIC BLOOD PRESSURE: 55 MMHG | SYSTOLIC BLOOD PRESSURE: 112 MMHG | RESPIRATION RATE: 18 BRPM | BODY MASS INDEX: 35.81 KG/M2 | HEART RATE: 86 BPM

## 2024-12-13 PROCEDURE — 99024 POSTOP FOLLOW-UP VISIT: CPT | Performed by: STUDENT IN AN ORGANIZED HEALTH CARE EDUCATION/TRAINING PROGRAM

## 2024-12-13 RX ORDER — ACETAMINOPHEN 500 MG
1000 TABLET ORAL EVERY 6 HOURS PRN
Qty: 30 TABLET | Refills: 0 | Status: SHIPPED | OUTPATIENT
Start: 2024-12-13

## 2024-12-13 RX ORDER — IBUPROFEN 600 MG/1
600 TABLET, FILM COATED ORAL EVERY 6 HOURS SCHEDULED
Qty: 30 TABLET | Refills: 0 | Status: SHIPPED | OUTPATIENT
Start: 2024-12-13

## 2024-12-13 RX ORDER — OXYCODONE HYDROCHLORIDE 5 MG/1
5 TABLET ORAL EVERY 8 HOURS PRN
Qty: 5 TABLET | Refills: 0 | Status: SHIPPED | OUTPATIENT
Start: 2024-12-13 | End: 2024-12-23

## 2024-12-13 RX ORDER — OXYCODONE HYDROCHLORIDE 10 MG/1
10 TABLET ORAL EVERY 4 HOURS PRN
Qty: 5 TABLET | Refills: 0 | Status: CANCELLED | OUTPATIENT
Start: 2024-12-13 | End: 2024-12-23

## 2024-12-13 RX ADMIN — IBUPROFEN 600 MG: 600 TABLET ORAL at 11:25

## 2024-12-13 RX ADMIN — ENOXAPARIN SODIUM 40 MG: 40 INJECTION SUBCUTANEOUS at 11:26

## 2024-12-13 RX ADMIN — ACETAMINOPHEN 650 MG: 325 TABLET, FILM COATED ORAL at 11:25

## 2024-12-13 RX ADMIN — DOCUSATE SODIUM 100 MG: 100 CAPSULE, LIQUID FILLED ORAL at 08:18

## 2024-12-13 RX ADMIN — ACETAMINOPHEN 650 MG: 325 TABLET, FILM COATED ORAL at 04:55

## 2024-12-13 RX ADMIN — IBUPROFEN 600 MG: 600 TABLET ORAL at 06:20

## 2024-12-13 NOTE — PLAN OF CARE
Problem: POSTPARTUM  Goal: Experiences normal postpartum course  Description: INTERVENTIONS:  - Monitor maternal vital signs  - Assess uterine involution and lochia  Outcome: Progressing  Goal: Appropriate maternal -  bonding  Description: INTERVENTIONS:  - Identify family support  - Assess for appropriate maternal/infant bonding   -Encourage maternal/infant bonding opportunities  - Referral to  or  as needed  Outcome: Progressing  Goal: Establishment of infant feeding pattern  Description: INTERVENTIONS:  - Assess breast/bottle feeding  - Refer to lactation as needed  Outcome: Progressing  Goal: Incision(s), wounds(s) or drain site(s) healing without S/S of infection  Description: INTERVENTIONS  - Assess and document dressing, incision, wound bed, drain sites and surrounding tissue  - Provide patient and family education  - Perform skin care  Outcome: Progressing     Problem: PAIN - ADULT  Goal: Verbalizes/displays adequate comfort level or baseline comfort level  Description: Interventions:  - Encourage patient to monitor pain and request assistance  - Assess pain using appropriate pain scale  - Administer analgesics based on type and severity of pain and evaluate response  - Implement non-pharmacological measures as appropriate and evaluate response  - Consider cultural and social influences on pain and pain management  - Notify physician/advanced practitioner if interventions unsuccessful or patient reports new pain  Outcome: Progressing     Problem: INFECTION - ADULT  Goal: Absence or prevention of progression during hospitalization  Description: INTERVENTIONS:  - Assess and monitor for signs and symptoms of infection  - Monitor lab/diagnostic results  - Monitor all insertion sites, i.e. indwelling lines, tubes, and drains  - Monitor endotracheal if appropriate and nasal secretions for changes in amount and color  - [place] appropriate cooling/warming therapies per order  -  Administer medications as ordered  - Instruct and encourage patient and family to use good hand hygiene technique  - Identify and instruct in appropriate isolation precautions for identified infection/condition  Outcome: Progressing  Goal: Absence of fever/infection during neutropenic period  Description: INTERVENTIONS:  - Monitor WBC    Outcome: Progressing     Problem: SAFETY ADULT  Goal: Patient will remain free of falls  Description: INTERVENTIONS:  - Educate patient/family on patient safety including physical limitations  - Instruct patient to call for assistance with activity   - Consult OT/PT to assist with strengthening/mobility   - Keep Call bell within reach  - Keep bed low and locked with side rails adjusted as appropriate  - Keep care items and personal belongings within reach  - Initiate and maintain comfort rounds  - Make Fall Risk Sign visible to staff  - Offer Toileting  Outcome: Progressing  Goal: Maintain or return to baseline ADL function  Description: INTERVENTIONS:  -  Assess patient's ability to carry out ADLs; assess patient's baseline for ADL function and identify physical deficits which impact ability to perform ADLs (bathing, care of mouth/teeth, toileting, grooming, dressing, etc.)  - Assess/evaluate cause of self-care deficits   - Assess range of motion  - Assess patient's mobility; develop plan if impaired  - Assess patient's need for assistive devices and provide as appropriate  - Encourage maximum independence but intervene and supervise when necessary  - Involve family in performance of ADLs  - Assess for home care needs following discharge   - Consider OT consult to assist with ADL evaluation and planning for discharge  - Provide patient education as appropriate  Outcome: Progressing     Problem: DISCHARGE PLANNING  Goal: Discharge to home or other facility with appropriate resources  Description: INTERVENTIONS:  - Identify barriers to discharge w/patient and caregiver  - Arrange for  needed discharge resources and transportation as appropriate  - Identify discharge learning needs (meds, wound care, etc.)  - Arrange for interpretive services to assist at discharge as needed  - Refer to Case Management Department for coordinating discharge planning if the patient needs post-hospital services based on physician/advanced practitioner order or complex needs related to functional status, cognitive ability, or social support system  Outcome: Progressing     Problem: Knowledge Deficit  Goal: Patient/family/caregiver demonstrates understanding of disease process, treatment plan, medications, and discharge instructions  Description: Complete learning assessment and assess knowledge base.  Interventions:  - Provide teaching at level of understanding  - Provide teaching via preferred learning methods  Outcome: Progressing

## 2024-12-13 NOTE — PROGRESS NOTES
"Progress Note - OB/GYN  Ayesha Tomlinson 24 y.o. adult MRN: [id number]  Unit/Bed#:  307-01 Encounter: [id number]    Assessment and Plan     Ayesha Tomlinson is a patient of: [facility] . She is PPD# 3 s/p  repeat  section, low transverse incision  Recovering well and is stable       History of  delivery, antepartum  Assessment & Plan  C/S at 2017      * Status post  delivery  Assessment & Plan       Continue routine post partum care       [id number], HgB 12.4 -> 10.4  Pain well controlled: tylenol/motrin scheduled, edwina prn  Lochia within normal limits: continue to monitor   OOB: as able, encourage ambulation  Passing flatus  [place] in place-Plan void trial today  DVT ppx: SCD, Lovenox 40mg qd          Disposition    - Anticipate discharge home on PPD# 3      Subjective/Objective     Chief Complaint: Postpartum State     Subjective:    Ayesha Tomlinson is PPD/POD#3 s/p  repeat  section, low transverse incision. She has no current complaints.  Pain is well controlled.  Patient is currently voiding.  She is ambulating.  Patient is currently passing flatus and has had no bowel movement. She is tolerating PO, and denies nausea or vomitting. Patient denies fever, chills, chest pain, shortness of breath, or calf tenderness. Lochia is minimal. She is  Breastfeeding. She is recovering well and is stable.       Vitals:   /57 (BP Location: Right arm)   Pulse 93   Temp 98.4 °F (36.9 °C) (Oral)   Resp 18   Ht 5' 3\" (1.6 m)   Wt 91.7 kg (202 lb 1.6 oz)   LMP 2024 (Exact Date)   SpO2 98%   Breastfeeding Yes   BMI 35.80 kg/m²     No intake or output data in the 24 hours ending 24 0616    Invasive Devices       None                   Physical Exam:   GEN: Ayesha Tomlinson appears well, alert and oriented x 3, pleasant and cooperative   CARDIO: RRR, no murmurs or rubs  RESP:  CTAB, no wheezes or rales  ABDOMEN: soft, no tenderness, no distention, fundus " firm below umbilicus, Incision C/D/I  EXTREMITIES: SCDs on, non tender, no erythema, b/l Markel's sign negative      Labs:     Hemoglobin   Date Value Ref Range Status   12/11/2024 10.4 (L) 12.0 - 15.4 g/dL Final   12/10/2024 12.4 12.0 - 15.4 g/dL Final     WBC   Date Value Ref Range Status   12/11/2024 26.17 (H) 4.31 - 10.16 Thousand/uL Final   12/10/2024 15.47 (H) 4.31 - 10.16 Thousand/uL Final     Platelets   Date Value Ref Range Status   12/11/2024 227 149 - 390 Thousands/uL Final   12/10/2024 257 149 - 390 Thousands/uL Final     Creatinine   Date Value Ref Range Status   04/19/2024 0.68 0.60 - 1.30 mg/dL Final     Comment:     Standardized to IDMS reference method   01/12/2023 0.64 0.50 - 0.96 mg/dL Final   05/09/2022 0.77 0.40 - 1.10 mg/dL Final   10/14/2021 0.76 0.40 - 1.10 mg/dL Final     AST   Date Value Ref Range Status   01/12/2023 12 10 - 30 U/L Final   05/09/2022 12 <41 U/L Final   10/14/2021 9 <41 U/L Final     ALT   Date Value Ref Range Status   01/12/2023 11 6 - 29 U/L Final   05/09/2022 20 <56 U/L Final   10/14/2021 18 <56 U/L Final          Juanita Yi MD  12/13/2024  6:16 AM

## 2024-12-13 NOTE — PLAN OF CARE
Problem: POSTPARTUM  Goal: Experiences normal postpartum course  Description: INTERVENTIONS:  - Monitor maternal vital signs  - Assess uterine involution and lochia  Outcome: Completed  Goal: Appropriate maternal -  bonding  Description: INTERVENTIONS:  - Identify family support  - Assess for appropriate maternal/infant bonding   -Encourage maternal/infant bonding opportunities  - Referral to  or  as needed  Outcome: Completed  Goal: Establishment of infant feeding pattern  Description: INTERVENTIONS:  - Assess breast/bottle feeding  - Refer to lactation as needed  Outcome: Completed  Goal: Incision(s), wounds(s) or drain site(s) healing without S/S of infection  Description: INTERVENTIONS  - Assess and document dressing, incision, wound bed, drain sites and surrounding tissue  - Provide patient and family education  - Perform skin care  Outcome: Completed     Problem: PAIN - ADULT  Goal: Verbalizes/displays adequate comfort level or baseline comfort level  Description: Interventions:  - Encourage patient to monitor pain and request assistance  - Assess pain using appropriate pain scale  - Administer analgesics based on type and severity of pain and evaluate response  - Implement non-pharmacological measures as appropriate and evaluate response  - Consider cultural and social influences on pain and pain management  - Notify physician/advanced practitioner if interventions unsuccessful or patient reports new pain  Outcome: Completed     Problem: INFECTION - ADULT  Goal: Absence or prevention of progression during hospitalization  Description: INTERVENTIONS:  - Assess and monitor for signs and symptoms of infection  - Monitor lab/diagnostic results  - Monitor all insertion sites, i.e. indwelling lines, tubes, and drains  - Monitor endotracheal if appropriate and nasal secretions for changes in amount and color  - [place] appropriate cooling/warming therapies per order  - Administer  medications as ordered  - Instruct and encourage patient and family to use good hand hygiene technique  - Identify and instruct in appropriate isolation precautions for identified infection/condition  Outcome: Completed  Goal: Absence of fever/infection during neutropenic period  Description: INTERVENTIONS:  - Monitor WBC    Outcome: Completed     Problem: SAFETY ADULT  Goal: Maintain or return to baseline ADL function  Description: INTERVENTIONS:  -  Assess patient's ability to carry out ADLs; assess patient's baseline for ADL function and identify physical deficits which impact ability to perform ADLs (bathing, care of mouth/teeth, toileting, grooming, dressing, etc.)  - Assess/evaluate cause of self-care deficits   - Assess range of motion  - Assess patient's mobility; develop plan if impaired  - Assess patient's need for assistive devices and provide as appropriate  - Encourage maximum independence but intervene and supervise when necessary  - Involve family in performance of ADLs  - Assess for home care needs following discharge   - Consider OT consult to assist with ADL evaluation and planning for discharge  - Provide patient education as appropriate  Outcome: Completed     Problem: SAFETY ADULT  Goal: Patient will remain free of falls  Description: INTERVENTIONS:  - Educate patient/family on patient safety including physical limitations  - Instruct patient to call for assistance with activity   - Consult OT/PT to assist with strengthening/mobility   - Keep Call bell within reach  - Keep bed low and locked with side rails adjusted as appropriate  - Keep care items and personal belongings within reach  - Initiate and maintain comfort rounds  - Make Fall Risk Sign visible to staff  - Offer Toileting  Outcome: Completed  Goal: Maintain or return to baseline ADL function  Description: INTERVENTIONS:  -  Assess patient's ability to carry out ADLs; assess patient's baseline for ADL function and identify physical  deficits which impact ability to perform ADLs (bathing, care of mouth/teeth, toileting, grooming, dressing, etc.)  - Assess/evaluate cause of self-care deficits   - Assess range of motion  - Assess patient's mobility; develop plan if impaired  - Assess patient's need for assistive devices and provide as appropriate  - Encourage maximum independence but intervene and supervise when necessary  - Involve family in performance of ADLs  - Assess for home care needs following discharge   - Consider OT consult to assist with ADL evaluation and planning for discharge  - Provide patient education as appropriate  Outcome: Completed     Problem: DISCHARGE PLANNING  Goal: Discharge to home or other facility with appropriate resources  Description: INTERVENTIONS:  - Identify barriers to discharge w/patient and caregiver  - Arrange for needed discharge resources and transportation as appropriate  - Identify discharge learning needs (meds, wound care, etc.)  - Arrange for interpretive services to assist at discharge as needed  - Refer to Case Management Department for coordinating discharge planning if the patient needs post-hospital services based on physician/advanced practitioner order or complex needs related to functional status, cognitive ability, or social support system  Outcome: Completed     Problem: Knowledge Deficit  Goal: Patient/family/caregiver demonstrates understanding of disease process, treatment plan, medications, and discharge instructions  Description: Complete learning assessment and assess knowledge base.  Interventions:  - Provide teaching at level of understanding  - Provide teaching via preferred learning methods  Outcome: Completed

## 2024-12-13 NOTE — PLAN OF CARE
Problem: POSTPARTUM  Goal: Experiences normal postpartum course  Description: INTERVENTIONS:  - Monitor maternal vital signs  - Assess uterine involution and lochia  Outcome: Progressing  Goal: Appropriate maternal -  bonding  Description: INTERVENTIONS:  - Identify family support  - Assess for appropriate maternal/infant bonding   -Encourage maternal/infant bonding opportunities  - Referral to  or  as needed  Outcome: Progressing  Goal: Establishment of infant feeding pattern  Description: INTERVENTIONS:  - Assess breast/bottle feeding  - Refer to lactation as needed  Outcome: Progressing  Goal: Incision(s), wounds(s) or drain site(s) healing without S/S of infection  Description: INTERVENTIONS  - Assess and document dressing, incision, wound bed, drain sites and surrounding tissue  - Provide patient and family education  - Perform skin care  Outcome: Progressing     Problem: PAIN - ADULT  Goal: Verbalizes/displays adequate comfort level or baseline comfort level  Description: Interventions:  - Encourage patient to monitor pain and request assistance  - Assess pain using appropriate pain scale  - Administer analgesics based on type and severity of pain and evaluate response  - Implement non-pharmacological measures as appropriate and evaluate response  - Consider cultural and social influences on pain and pain management  - Notify physician/advanced practitioner if interventions unsuccessful or patient reports new pain  Outcome: Progressing     Problem: INFECTION - ADULT  Goal: Absence or prevention of progression during hospitalization  Description: INTERVENTIONS:  - Assess and monitor for signs and symptoms of infection  - Monitor lab/diagnostic results  - Monitor all insertion sites, i.e. indwelling lines, tubes, and drains  - Monitor endotracheal if appropriate and nasal secretions for changes in amount and color  - [place] appropriate cooling/warming therapies per order  -  Administer medications as ordered  - Instruct and encourage patient and family to use good hand hygiene technique  - Identify and instruct in appropriate isolation precautions for identified infection/condition  Outcome: Progressing  Goal: Absence of fever/infection during neutropenic period  Description: INTERVENTIONS:  - Monitor WBC    Outcome: Progressing     Problem: SAFETY ADULT  Goal: Patient will remain free of falls  Description: INTERVENTIONS:  - Educate patient/family on patient safety including physical limitations  - Instruct patient to call for assistance with activity   - Consult OT/PT to assist with strengthening/mobility   - Keep Call bell within reach  - Keep bed low and locked with side rails adjusted as appropriate  - Keep care items and personal belongings within reach  - Initiate and maintain comfort rounds  - Make Fall Risk Sign visible to staff  - Offer Toileting  Outcome: Progressing  Goal: Maintain or return to baseline ADL function  Description: INTERVENTIONS:  -  Assess patient's ability to carry out ADLs; assess patient's baseline for ADL function and identify physical deficits which impact ability to perform ADLs (bathing, care of mouth/teeth, toileting, grooming, dressing, etc.)  - Assess/evaluate cause of self-care deficits   - Assess range of motion  - Assess patient's mobility; develop plan if impaired  - Assess patient's need for assistive devices and provide as appropriate  - Encourage maximum independence but intervene and supervise when necessary  - Involve family in performance of ADLs  - Assess for home care needs following discharge   - Consider OT consult to assist with ADL evaluation and planning for discharge  - Provide patient education as appropriate  Outcome: Progressing     Problem: DISCHARGE PLANNING  Goal: Discharge to home or other facility with appropriate resources  Description: INTERVENTIONS:  - Identify barriers to discharge w/patient and caregiver  - Arrange for  needed discharge resources and transportation as appropriate  - Identify discharge learning needs (meds, wound care, etc.)  - Arrange for interpretive services to assist at discharge as needed  - Refer to Case Management Department for coordinating discharge planning if the patient needs post-hospital services based on physician/advanced practitioner order or complex needs related to functional status, cognitive ability, or social support system  Outcome: Progressing     Problem: Knowledge Deficit  Goal: Patient/family/caregiver demonstrates understanding of disease process, treatment plan, medications, and discharge instructions  Description: Complete learning assessment and assess knowledge base.  Interventions:  - Provide teaching at level of understanding  - Provide teaching via preferred learning methods  Outcome: Progressing

## 2024-12-13 NOTE — LACTATION NOTE
This note was copied from a baby's chart.  CONSULT - LACTATION  Baby Girl (Janette Tomlinson 3 days female MRN: [id number]    [facility] NURSERY Room / Bed: (N)/(N) Encounter: [id number]      Met with Ayesha, who is being discharged to home with her baby girl today. Mom states that feeding is going well.     She was provided with the Breastfeeding Discharge Breastfeeding Booklet and information was briefly reviewed.     Also reviewed baby's second night and that baby may be fussy the first night home as a result of the change to the new environment.    Encouraged her to utilize the [facility] for lactation support as needed. Information provided.        Nora Haynes RN 12/13/2024 12:16 PM

## 2024-12-14 NOTE — ANESTHESIA POSTPROCEDURE EVALUATION
Post-Op Assessment Note            No anethesia notable event occurred.            Last Filed PACU Vitals:  Vitals Value Taken Time   Temp 99 °F (37.2 °C) 12/10/24 2330   Pulse 99 12/11/24 0030   /56 12/11/24 0030   Resp 18 12/11/24 0030   SpO2 93 % 12/11/24 0015       Modified Lalit:  No data recorded

## 2024-12-16 ENCOUNTER — TELEPHONE (OUTPATIENT)
Dept: OBGYN CLINIC | Facility: CLINIC | Age: 24
End: 2024-12-16

## 2024-12-16 ENCOUNTER — TRANSITIONAL CARE MANAGEMENT (OUTPATIENT)
Dept: INTERNAL MEDICINE CLINIC | Facility: CLINIC | Age: 24
End: 2024-12-16

## 2024-12-16 ENCOUNTER — PATIENT OUTREACH (OUTPATIENT)
Dept: OBGYN CLINIC | Facility: CLINIC | Age: 24
End: 2024-12-16

## 2024-12-16 DIAGNOSIS — Z59.41 FOOD INSECURITY: Primary | ICD-10-CM

## 2024-12-16 PROCEDURE — TCMXX

## 2024-12-16 SDOH — ECONOMIC STABILITY - FOOD INSECURITY: FOOD INSECURITY: Z59.41

## 2024-12-16 NOTE — PROGRESS NOTES
JULES referral received via [facility] report following delivery on 12/10 for food insecurity. Patient did not meet with IPSW prior to d/c. SW called patient to complete assessment, no answer. JULES left  requesting a call back.     Patient has no upcoming appointments scheduled - per chart review, patient had contacted office to schedule postpartum/incision check visit at [facility]. NATY Portillo's note was routed to clerical pod for assistance with scheduling.    JULES's note routed to last seen provider.

## 2024-12-16 NOTE — UTILIZATION REVIEW
"Mother and baby discharged on 2024      NOTIFICATION OF INPATIENT ADMISSION   MATERNITY/DELIVERY AUTHORIZATION REQUEST   SERVICING FACILITY:   [facility] - L&D, , NICU  [address] [place] [address]  Tax ID: [id number]  NPI: [id number]   ATTENDING PROVIDER:  Attending Name and NPI#: Paula Correia Md [[id number]]  Address: [address] [place] [address]  Phone: [phone]   ADMISSION INFORMATION:  Place of Service: Inpatient [facility]  Place of Service Code: 21  Inpatient Admission Date/Time: 12/10/24 10:20 AM  Discharge Date/Time: 2024  3:30 PM  Admitting Diagnosis Code/Description:  40 weeks gestation of pregnancy [Z3A.40]  Uterine contractions [O47.9]  Encounter for  delivery without indication [O82]     Mother: Ayesha Tomlinson 2000 Estimated Date of Delivery: 24  Delivering clinician:     OB History          2    Para   2    Term   2            AB        Living   2         SAB        IAB        Ectopic        Multiple   0    Live Births   2               [place] Name & MRN:   Information for the patient's :  Esmer Atwood [[id number]]    Delivery Information:  Sex: female  Delivered 12/10/2024 9:56 PM by , Low Transverse; Gestational Age: 40w1d    [place] Measurements:  Weight: 7 lb 15.2 oz (3605 g);  Height: 20\"    APGAR 1 minute 5 minutes 10 minutes   Totals: 9 9       UTILIZATION REVIEW CONTACT:  Yasmine Nieto Utilization   Network Utilization Review Department  Phone: [phone]  Fax [phone]  Email: [email or web address]  Contact for approvals/pending authorizations, clinical reviews, and discharge.     PHYSICIAN ADVISORY SERVICES:  Medical Necessity Denial & Iugj-rt-Qcgb Review  Phone: [phone]  Fax: [phone]  Email: [email or web address]     DISCHARGE SUPPORT TEAM:  For Patients " Discharge Needs & Updates  Phone: [phone] opt. 2 Fax: [phone]  Email: [email or web address]

## 2024-12-16 NOTE — TELEPHONE ENCOUNTER
Placed call to patient to offer assistance in scheduling appointment in office for recommended postpartum care and to complete postpartum telephone assessment, left a non detailed message to return our call.

## 2024-12-17 ENCOUNTER — TELEPHONE (OUTPATIENT)
Dept: OBGYN CLINIC | Facility: CLINIC | Age: 24
End: 2024-12-17

## 2024-12-17 LAB — PLACENTA IN STORAGE: NORMAL

## 2024-12-17 NOTE — TELEPHONE ENCOUNTER
LMOM for pt to call office back so we can get her scheduled for Incision check appt and for a post partum appt. Pt had C/S on: 12/10/24

## 2024-12-19 ENCOUNTER — OFFICE VISIT (OUTPATIENT)
Dept: OBGYN CLINIC | Facility: CLINIC | Age: 24
End: 2024-12-19

## 2024-12-19 VITALS
BODY MASS INDEX: 34.73 KG/M2 | SYSTOLIC BLOOD PRESSURE: 112 MMHG | WEIGHT: 196 LBS | HEIGHT: 63 IN | DIASTOLIC BLOOD PRESSURE: 76 MMHG

## 2024-12-19 PROCEDURE — 99024 POSTOP FOLLOW-UP VISIT: CPT | Performed by: PHYSICIAN ASSISTANT

## 2024-12-19 NOTE — PROGRESS NOTES
OB/GYN  PN Visit  Ayesha Tomlinson  [id number]  10/15/2024  8:10 AM  RAYSHAWN Soria    S: 24 y.o.  32w1d here for PN visit. Pregnancy complicated by hx of LTCS and depression. +smoking    OB complaints:  Denies c/o v, no edema,  no DV.   No vb/lof  No ctxns or signs of PTL.    She endorses good fetal movement. She reports mild cramping with movement.+occ headaches and nausea; tolerable.     O:    Pre- Vitals      Flowsheet Row Most Recent Value   Prenatal Assessment    Fetal Heart Rate 145   Fundal Height (cm) 34 cm   Movement Present   Prenatal Vitals    Blood Pressure 110/78   Weight - Scale 84.8 kg (187 lb)   Urine Albumin/Glucose    Dilation/Effacement/Station    Vaginal Drainage    Edema               Gen: no acute distress, nonlabored breathing.  OB exam completed: fundal height, +FHT.  Urine: -/-    A/P:  Problem List Items Addressed This Visit       History of  delivery, antepartum - Primary    Prenatal care in third trimester     Labor precautions reviewed  Fetal kick counts reviewed  Tdap: 24  Labs UTD; missing 1 hr gtt- advised to complete!   Rh+  Flu: given today  RSV: desires at next visit  Birth plan: TOLAC; previously reviewed risks with Dr. Dickerson.   Feeding: breast, pump ordered.   Pediatrician: [facility]  Contraception: declined  GBS: at 36 weeks   Growth US: 10/15/24.  Delivery consent signed today.   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 [employer] 1/8. 1.5 hrs. No frozen

## 2024-12-23 ENCOUNTER — PATIENT OUTREACH (OUTPATIENT)
Dept: OBGYN CLINIC | Facility: CLINIC | Age: 24
End: 2024-12-23

## 2024-12-23 NOTE — PROGRESS NOTES
Second attempt to reach patient regarding SDOH screening (food insecurity) from IP stay for delivery at [facility]. No answer - SW left VM requesting a call back. Patient's next appointment is scheduled for 1/8/25.     Due to lack of response from patient, SW sent UTR via [employer]. Note routed to last seen provider. SW closing referral, please re-refer as needed.

## 2024-12-23 NOTE — LETTER
December 23, 2024    Ayesha Tomlinson  [address] [id number]      Elvin Sawyer,      I hope you are doing well. I am a  with [place] [facility] [facility]. I am reaching out because I have made a few attempts to contact you by phone. Unfortunately, I have not been able to reach you! I apologize if I have been calling at a bad time.       If you are interested in social work services or resources, I kindly request that you contact me at [phone] so that I can assist with your care needs. My normal hours are Monday through Friday, 8:00am to 4:30pm.      Take care,      BOBBY Fong, LSW

## 2025-01-04 ENCOUNTER — HOSPITAL ENCOUNTER (EMERGENCY)
Facility: HOSPITAL | Age: 25
Discharge: HOME | End: 2025-01-04
Attending: EMERGENCY MEDICINE | Admitting: EMERGENCY MEDICINE
Payer: COMMERCIAL

## 2025-01-04 VITALS
SYSTOLIC BLOOD PRESSURE: 123 MMHG | WEIGHT: 182 LBS | DIASTOLIC BLOOD PRESSURE: 74 MMHG | OXYGEN SATURATION: 97 % | RESPIRATION RATE: 18 BRPM | TEMPERATURE: 99.2 F | HEART RATE: 109 BPM | BODY MASS INDEX: 32.24 KG/M2

## 2025-01-04 DIAGNOSIS — J03.90 ACUTE TONSILLITIS: Primary | ICD-10-CM

## 2025-01-04 DIAGNOSIS — J02.9 ACUTE PHARYNGITIS: ICD-10-CM

## 2025-01-04 LAB — S PYO DNA THROAT QL NAA+PROBE: NOT DETECTED

## 2025-01-04 PROCEDURE — 87651 STREP A DNA AMP PROBE: CPT | Performed by: EMERGENCY MEDICINE

## 2025-01-04 PROCEDURE — 99284 EMERGENCY DEPT VISIT MOD MDM: CPT | Performed by: EMERGENCY MEDICINE

## 2025-01-04 PROCEDURE — 99283 EMERGENCY DEPT VISIT LOW MDM: CPT

## 2025-01-04 RX ORDER — AMOXICILLIN 250 MG/1
500 CAPSULE ORAL ONCE
Status: COMPLETED | OUTPATIENT
Start: 2025-01-04 | End: 2025-01-04

## 2025-01-04 RX ORDER — AMOXICILLIN 500 MG/1
500 CAPSULE ORAL 3 TIMES DAILY
Qty: 29 CAPSULE | Refills: 0 | Status: SHIPPED | OUTPATIENT
Start: 2025-01-04 | End: 2025-01-14

## 2025-01-04 RX ORDER — IBUPROFEN 400 MG/1
400 TABLET, FILM COATED ORAL ONCE
Status: COMPLETED | OUTPATIENT
Start: 2025-01-04 | End: 2025-01-04

## 2025-01-04 RX ORDER — ACETAMINOPHEN 325 MG/1
975 TABLET ORAL ONCE
Status: COMPLETED | OUTPATIENT
Start: 2025-01-04 | End: 2025-01-04

## 2025-01-04 RX ADMIN — DEXAMETHASONE SODIUM PHOSPHATE 10 MG: 10 INJECTION, SOLUTION INTRAMUSCULAR; INTRAVENOUS at 21:00

## 2025-01-04 RX ADMIN — IBUPROFEN 400 MG: 400 TABLET, FILM COATED ORAL at 21:00

## 2025-01-04 RX ADMIN — AMOXICILLIN 500 MG: 250 CAPSULE ORAL at 21:00

## 2025-01-04 RX ADMIN — ACETAMINOPHEN 975 MG: 325 TABLET, FILM COATED ORAL at 21:00

## 2025-01-05 NOTE — ED PROVIDER NOTES
"Time reflects when diagnosis was documented in both MDM as applicable and the Disposition within this note       Time User Action Codes Description Comment    1/4/2025  8:57 PM Diane Garibay Add [J03.90] Acute tonsillitis     1/4/2025  8:57 PM Diane Garibay Add [J02.9] Acute pharyngitis           ED Disposition       ED Disposition   Discharge    Condition   Stable    Date/Time   Sat Jan 4, 2025  8:57 PM    Comment   Ayesha Santanaab discharge to home/self care.                   Assessment & Plan       Medical Decision Making  Patient with acute onset sore throat, injection with swelling, tender lymphadenopathy and temperature elevation.  No nasal congestion, ear pain and only minimal cough.  History of recurrent strep pharyngitis/tonsillitis.  Findings consistent with same.  Initiating amoxicillin for treatment.  Will give one-time dose of dexamethasone to help with throat swelling/discomfort.  Assured that these medications in addition to acetaminophen and ibuprofen which will be given are compatible with nursing.  Reviewed signs/symptoms for which to return including inability to swallow, dyspnea, increased pain especially if associated with unilateral tonsillar enlargement.    Amount and/or Complexity of Data Reviewed  Labs: ordered.    Risk  OTC drugs.  Prescription drug management.             Medications   acetaminophen (TYLENOL) tablet 975 mg (975 mg Oral Given 1/4/25 2100)   ibuprofen (MOTRIN) tablet 400 mg (400 mg Oral Given 1/4/25 2100)   dexamethasone oral liquid 10 mg 1 mL (10 mg Oral Given 1/4/25 2100)   amoxicillin (AMOXIL) capsule 500 mg (500 mg Oral Given 1/4/25 2100)       ED Risk Strat Scores                                              History of Present Illness       Chief Complaint   Patient presents with    Sore Throat     Pt c/o sore throat and difficulty swallowing  since yesterday. States she thinks she has \"strep.\"       Past Medical History:   Diagnosis " Date    Allergic     Anemia     Anxiety     Depression     previously on Prozac - d/c age 11    Headache(784.0)     Kidney stone     Migraine     Varicella     vaccinated      Past Surgical History:   Procedure Laterality Date     SECTION  2017    CHOLECYSTECTOMY  2017    CT  DELIVERY ONLY N/A 12/10/2024    Procedure:  SECTION ();  Surgeon: Paula Correia MD;  Location: [facility] ;  Service: Obstetrics      Family History   Problem Relation Age of Onset    Cancer Mother         cervical    Migraines Mother     Hypertension Sister         gestational    Migraines Sister     Pulmonary embolism Maternal Grandmother       Social History     Tobacco Use    Smoking status: Some Days     Current packs/day: 0.50     Types: Cigarettes    Smokeless tobacco: Never   Vaping Use    Vaping status: Former    Substances: Nicotine, THC, CBD, Flavoring   Substance Use Topics    Alcohol use: Not Currently     Alcohol/week: 6.0 - 8.0 standard drinks of alcohol     Types: 2 Glasses of wine, 4 - 6 Shots of liquor per week     Comment: not currrently due to preg    Drug use: Not Currently     Types: Marijuana     Comment: rare      E-Cigarette/Vaping    E-Cigarette Use Former User       E-Cigarette/Vaping Substances    Nicotine Yes     THC Yes     CBD Yes     Flavoring Yes       I have reviewed and agree with the history as documented.     Patient is a 24-year-old who presents to the emergency department expressing concern for having strep throat.  Reports approximately annual occurrence.  She began feeling poorly at 3 AM with sore throat, painful swallowing, headache and sore glands.  She gestures to the bilateral anterior cervical nodes.  She is able to swallow her saliva and fluids though notes that this is very uncomfortable.  Decreased intake of foods as a result.  No dyspnea.  She has occasional cough.  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  No nausea, vomiting, diarrhea or urinary symptoms.  No abdominal discomfort.   was 1210.  No specific known sick contacts.        Review of Systems   HENT:  Negative for congestion and ear pain.    All other systems reviewed and are negative.          Objective       ED Triage Vitals [25]   Temperature Pulse Blood Pressure Respirations SpO2 Patient Position - Orthostatic VS   99.2 °F (37.3 °C) (!) 121 123/74 18 98 % --      Temp Source Heart Rate Source BP Location FiO2 (%) Pain Score    Oral Monitor -- -- 7      Vitals      Date and Time Temp Pulse SpO2 Resp BP Pain Score FACES Pain Rating User   25 2106 -- 109 97 % 18 -- -- -- [place]   25 99.2 °F (37.3 °C) 121 98 % 18 123/74 7 -- CH            Physical Exam  Vitals and nursing note reviewed.   Constitutional:       Appearance: Dom is well-developed.      Comments: Appears mildly malaised   HENT:      Right Ear: Tympanic membrane and ear canal normal. No drainage.      Left Ear: Tympanic membrane and ear canal normal. No drainage.      Nose: No congestion or rhinorrhea.      Mouth/Throat:      Mouth: Mucous membranes are moist. No oral lesions.      Pharynx: Uvula midline. Posterior oropharyngeal erythema present. No oropharyngeal exudate.      Tonsils: 2+ on the right. 2+ on the left.      Comments: Oropharynx and tonsils injected.  Tonsils enlarged.  Tiny amounts of mucus appreciated in tonsillar crypts bilaterally.  Eyes:      Conjunctiva/sclera: Conjunctivae normal.   Cardiovascular:      Rate and Rhythm: Regular rhythm. Tachycardia present.      Heart sounds: Normal heart sounds.   Pulmonary:      Effort: Pulmonary effort is normal.      Breath sounds: Normal breath sounds.   Musculoskeletal:      Cervical back: Normal range of motion and neck supple.   Lymphadenopathy:      Cervical: Cervical adenopathy present.   Skin:     General: Skin is warm and dry.   Neurological:      Mental Status: Dom is alert.          Results Reviewed       Procedure Component Value Units Date/Time    Strep A PCR [[id number]]  (Normal) Collected: 01/04/25 2016    Lab Status: Final result Specimen: Throat Updated: 01/04/25 2109     STREP A PCR Not Detected            No orders to display       Procedures    ED Medication and Procedure Management   Prior to Admission Medications   Prescriptions Last Dose Informant Patient Reported? Taking?   Prenatal Vit-Fe Fumarate-FA (PRENATAL VITAMINS PO)   Yes No   Sig: Take 2 tablets by mouth gummies   acetaminophen (TYLENOL) 500 mg tablet   No No   Sig: Take 2 tablets (1,000 mg total) by mouth every 6 (six) hours as needed for moderate pain, mild pain or headaches   Patient not taking: Reported on 12/19/2024   calcium carbonate (TUMS) 500 mg chewable tablet   Yes No   Sig: Chew 1 tablet daily   Patient not taking: Reported on 12/19/2024   ibuprofen (MOTRIN) 600 mg tablet   No No   Sig: Take 1 tablet (600 mg total) by mouth every 6 (six) hours      Facility-Administered Medications: None     Discharge Medication List as of 1/4/2025  9:00 PM        START taking these medications    Details   amoxicillin (AMOXIL) 500 mg capsule Take 1 capsule (500 mg total) by mouth 3 (three) times a day for 10 days, Starting Sat 1/4/2025, Until Tue 1/14/2025, Normal           CONTINUE these medications which have NOT CHANGED    Details   acetaminophen (TYLENOL) 500 mg tablet Take 2 tablets (1,000 mg total) by mouth every 6 (six) hours as needed for moderate pain, mild pain or headaches, Starting Fri 12/13/2024, Normal      calcium carbonate (TUMS) 500 mg chewable tablet Chew 1 tablet daily, Historical Med      ibuprofen (MOTRIN) 600 mg tablet Take 1 tablet (600 mg total) by mouth every 6 (six) hours, Starting Fri 12/13/2024, Normal      Prenatal Vit-Fe Fumarate-FA (PRENATAL VITAMINS PO) Take 2 tablets by mouth gummies, Historical Med           No discharge procedures on file.  ED SEPSIS DOCUMENTATION   Time reflects when  diagnosis was documented in both MDM as applicable and the Disposition within this note       Time User Action Codes Description Comment    1/4/2025  8:57 PM Diane Garibay Add [J03.90] Acute tonsillitis     1/4/2025  8:57 PM Diane Garibay Add [J02.9] Acute pharyngitis                  Diane Garibay MD  01/05/25 2035

## 2025-01-05 NOTE — DISCHARGE INSTRUCTIONS
Drink plenty of fluids to stay well-hydrated.  You may take acetaminophen 975-1000 mg orally every 6 hours and ibuprofen up to 600 mg every 6 hours.    Take amoxicillin orally 3 times daily for the next 10 days.    Received medical attention as needed for any worsening such as inability to swallow, increased discomfort or worsening pain especially if unilateral/one-sided.

## 2025-01-07 NOTE — PROGRESS NOTES
"Postpartum Visit    25      Divina Tomlinson is a 24 y.o.  female who presents for a postpartum visit. She had a  delivery on 24 @ 40w1d.  Complications included none apparent. She is currently experiencing periods of hot flashes feeling like she \"is burning up.\" She has had intermittent infrequent headaches relieved by tylenol. Normotensive in department. Believes related to sleep deprivation.     She delivered a female .  Baby's course has been good. .   Baby is feeding by breastfeeding.     Lochia is no bleeding.   Bowel function is normal.   Bladder function is normal.   Chest pain: denies  Shortness of breath:denies  Leg pain: denies  Patient has not been sexually active.   Desired contraception method is IUD, lilletta preferred then syd.     Marsing score: 0    Gestational Diabetes: denies  Gestational HTN/Preeclampsia: denies  Pregnancy Complications: depression     The following portions of the patient's history were reviewed and updated as appropriate: allergies, current medications, past family history, past medical history, past social history, past surgical history, and problem list.      Current Outpatient Medications:     acetaminophen (TYLENOL) 500 mg tablet, Take 2 tablets (1,000 mg total) by mouth every 6 (six) hours as needed for moderate pain, mild pain or headaches, Disp: 30 tablet, Rfl: 0    amoxicillin (AMOXIL) 500 mg capsule, Take 1 capsule (500 mg total) by mouth 3 (three) times a day for 10 days, Disp: 29 capsule, Rfl: 0    ibuprofen (MOTRIN) 600 mg tablet, Take 1 tablet (600 mg total) by mouth every 6 (six) hours, Disp: 30 tablet, Rfl: 0    nystatin (MYCOSTATIN) powder, Apply topically 2 (two) times a day, Disp: 30 g, Rfl: 0    Prenatal Vit-Fe Fumarate-FA (PRENATAL VITAMINS PO), Take 2 tablets by mouth gummies, Disp: , Rfl:     calcium carbonate (TUMS) 500 mg chewable tablet, Chew 1 tablet daily (Patient not taking: Reported on " 1/8/2025), Disp: , Rfl:     No Known Allergies    Review of Systems  Constitutional: no fever, reporting intermittent hot flashes,   Breasts: no complaints of breast pain, breast lump, or nipple discharge  Gastrointestinal: no complaints nausea, vomiting  Genitourinary: as noted in HPI.  Neurological: endorses headaches, no dizziness, blurry vision.    Objective      /74 (BP Location: Left arm, Patient Position: Sitting, Cuff Size: Standard)   Wt 82.1 kg (181 lb)   LMP 03/04/2024 (Exact Date)   Breastfeeding Yes   BMI 32.06 kg/m²   Physical Exam  Vitals and nursing note reviewed.   Constitutional:       General: Dom is not in acute distress.     Appearance: Normal appearance. Dom is not ill-appearing.   Cardiovascular:      Rate and Rhythm: Normal rate.   Pulmonary:      Effort: Pulmonary effort is normal. No respiratory distress.   Abdominal:      General: Abdomen is flat.      Palpations: Abdomen is soft.   Genitourinary:     Exam position: Lithotomy position.      Labia:         Right: No rash, tenderness or lesion.         Left: No rash, tenderness or lesion.       Vagina: No signs of injury. No vaginal discharge, erythema, tenderness or bleeding.      Cervix: Normal. No cervical motion tenderness, discharge, lesion, erythema or cervical bleeding.      Uterus: Normal. Not tender.       Adnexa:         Right: No tenderness.          Left: No tenderness.     Skin:     Capillary Refill: Capillary refill takes less than 2 seconds.      Findings: Rash (red rash consistent with fungal infection surrounding incision. No swelling, itching, tenderness, drainage noted.) present.   Neurological:      Mental Status: Dom is alert and oriented to person, place, and time.   Psychiatric:         Mood and Affect: Mood normal.         Behavior: Behavior normal.         Thought Content: Thought content normal.         Judgment: Judgment normal.       Assessment/Plan:  Assessment & Plan   Diagnoses and all orders for this  visit:    Postpartum care and examination    Incisional irritation, initial encounter  -     nystatin (MYCOSTATIN) powder; Apply topically 2 (two) times a day    Hot flashes  -     TSH, 3rd generation with Free T4 reflex; Future        Ayesha Tomlinson is a 24 y.o. who is postpartum from a C/S with an abnormal postpartum examination.    1. Contraception: IUD. Given prior auth forms for Lilletta vs Tisha. She has had both IUD prior and will schedule appointment.    2. Annual exam due in April 2025. Last Pap : NILM, -HPV 5/2024   3. Lactation consult, [facility] information discussed.   4. Increase activity as tolerated, may resume all normal activity.  5. Anticipated return to work: 6 - 8 weeks post partum.  6. Depression Screening negative   7. Discussed rash surrounding incision consistent with fungal infection. Advised to use nystatin powder to place on incision minimum twice daily. Advised to wash incision with soap and water and keep area dry. Will reevaluate in several weeks at IUD insertion appointment. Advised if incision becomes painful, swollen, increased redness, drainage from incision or fevers to please notify office immediately.   8. Due to severity of hot flashes advised to obtain thyroid levels and will follow up once results are reviewed.

## 2025-01-08 ENCOUNTER — POSTPARTUM VISIT (OUTPATIENT)
Dept: OBGYN CLINIC | Facility: CLINIC | Age: 25
End: 2025-01-08
Payer: COMMERCIAL

## 2025-01-08 VITALS — WEIGHT: 181 LBS | SYSTOLIC BLOOD PRESSURE: 120 MMHG | BODY MASS INDEX: 32.06 KG/M2 | DIASTOLIC BLOOD PRESSURE: 74 MMHG

## 2025-01-08 DIAGNOSIS — T81.89XA INCISIONAL IRRITATION, INITIAL ENCOUNTER: ICD-10-CM

## 2025-01-08 DIAGNOSIS — R23.2 HOT FLASHES: ICD-10-CM

## 2025-01-08 RX ORDER — NYSTATIN 100000 [USP'U]/G
POWDER TOPICAL 2 TIMES DAILY
Qty: 30 G | Refills: 0 | Status: SHIPPED | OUTPATIENT
Start: 2025-01-08

## 2025-01-20 ENCOUNTER — NURSE TRIAGE (OUTPATIENT)
Age: 25
End: 2025-01-20

## 2025-01-20 NOTE — TELEPHONE ENCOUNTER
Placed call to patient to notify her of Holly Melton recommendation, no answer, left a non detailed voice message to call back with phone number provided [phone]

## 2025-01-20 NOTE — TELEPHONE ENCOUNTER
I have offered her an appointment for tomorrow morning for re-evaluation. If pain worsens, having drainage, increased swelling, redness to incision site or develop fevers to please notify us!

## 2025-01-20 NOTE — TELEPHONE ENCOUNTER
Pt calling in stating that she had a  on 12/10/24, pt stating that she had sharp pains around her  incision that was 6/10 and was coming and going but pt denies having this pain now. Pt stating that the redness around the incision area as well and pt stating that she is using nystatin powder. Pt was prescribed the nystatin powder on 25 for fungal infection.     Pt would like further recommendations from provider

## 2025-01-21 ENCOUNTER — OFFICE VISIT (OUTPATIENT)
Dept: OBGYN CLINIC | Facility: CLINIC | Age: 25
End: 2025-01-21
Payer: COMMERCIAL

## 2025-01-21 VITALS — SYSTOLIC BLOOD PRESSURE: 116 MMHG | DIASTOLIC BLOOD PRESSURE: 74 MMHG | BODY MASS INDEX: 32.24 KG/M2 | WEIGHT: 182 LBS

## 2025-01-21 DIAGNOSIS — R10.2 PELVIC PAIN: ICD-10-CM

## 2025-01-21 DIAGNOSIS — T81.89XA INCISIONAL IRRITATION, INITIAL ENCOUNTER: Primary | ICD-10-CM

## 2025-01-21 PROCEDURE — 99213 OFFICE O/P EST LOW 20 MIN: CPT

## 2025-01-21 NOTE — PROGRESS NOTES
Name: Ayesha Tomlinson      : 2000      MRN: [id number]  Encounter Provider: RAYSHAWN Sheffield  Encounter Date: 2025   Encounter department: [facility] [place]  :  Assessment & Plan  Pelvic pain    Orders:    US pelvis complete w transvaginal; Future    Discussed LLQ tenderness and advised could be GI related such as constipation. Advised to increase fluids and fiber. Offered expectant management and monitor vs pelvic ultrasound to rule our GYN etiology.  Advised can take ibuprofen and tylenol for pain and if pain worsens to please notify office. If bleeding persists can notify office.   Incisional irritation, initial encounter       1. Discussed incisional rash is improved from previous appointment. Advised incision is closed and healed. No drainage, redness, or swelling at incisional site and does not appear to be infected. Advised to continue to monitor symptoms and if develops a fever to please notify office.   Postpartum care following  delivery       1. Has Liletta insertion 25 and annual exam scheduled 3/17/25.       History of Present Illness     HPI  Ayesha Tomlinson is a 24 y.o. adult who presents s/p  section on 12/10/24. Seen by me on 25 for postpartum appointment found to have fungal infection around incision site and prescribed nystatin cream. She has been using the nystatin cream daily. She reports two days ago had right sided incisional pain lasting on and off for three hours, relieved with ice pack. Also noted foul odor to incision and sister thought incision opened. She also endorsed LLQ abdominal pain at the same time. She is asymptomatic today, denying incisional pain and odor. She continues with LLQ tenderness.  Having daily bowel movements and denies nausea/vomiting. She denies fevers, dizziness, weakness. Lochia is spotting. She denies urinary/vaginal symptoms including dysuria, frequency, urgency, discharge,  itching and irritation.   History obtained from: patient    Review of Systems   Constitutional:  Negative for activity change, appetite change, fatigue and fever.   Gastrointestinal:  Positive for abdominal pain. Negative for constipation, nausea and vomiting.   Genitourinary:  Positive for pelvic pain. Negative for dysuria, frequency, menstrual problem, urgency, vaginal bleeding, vaginal discharge and vaginal pain.   Neurological:  Negative for dizziness and weakness.   Psychiatric/Behavioral: Negative.       Medical History Reviewed by provider this encounter:  Tobacco  Allergies  Meds  Problems  Med Hx  Surg Hx  Fam Hx     .     Objective   /74 (BP Location: Left arm, Patient Position: Sitting, Cuff Size: Standard)   Wt 82.6 kg (182 lb)   LMP 03/04/2024 (Exact Date)   Breastfeeding Yes   BMI 32.24 kg/m²      Physical Exam  Vitals and nursing note reviewed.   Constitutional:       General: Dom is not in acute distress.     Appearance: Normal appearance. Dom is not ill-appearing.   Pulmonary:      Effort: Pulmonary effort is normal. No respiratory distress.   Abdominal:      General: Abdomen is flat. There is no distension.      Palpations: Abdomen is soft.      Tenderness: There is abdominal tenderness (LLQ).          Comments: Incision is well healed, approximated, mild amount of redness around incision improved from previous examination. No drainage, swelling or odor.    Skin:     General: Skin is warm and dry.      Capillary Refill: Capillary refill takes less than 2 seconds.   Neurological:      Mental Status: Dom is alert and oriented to person, place, and time.   Psychiatric:         Mood and Affect: Mood normal.         Behavior: Behavior normal.         Thought Content: Thought content normal.         Judgment: Judgment normal.

## 2025-03-16 NOTE — PROGRESS NOTES
"  Assessment & Plan   Diagnoses and all orders for this visit:    Women's annual routine gynecological examination        ASSESSMENT & PLAN: Ayesha is a 24 y.o.  with normal gynecologic exam.    1.  Routine well woman exam done today.  2.  Cervical Cancer Screening: Pap was not done today.  Current ASCCP Guidelines reviewed.   3.  Ayesha declined STD testing in a mutually monogamous relationship.   4.  Gardasil is recommended for patients from 9-45 years of age. Ayesha has  had the Gardasil vaccine series.   5. She accepted contraception. Initially requested Liletta IUD PP and missed appt on 25. Desires to have IUD inserted.   6. I have discussed the importance of monthly self-breast exams, exercise a minimum of 150 minutes each week including weight bearing exercises while maintaining a healthy diet including adequate intake of Calcium and Vitamin D.   7. Return to office in 12 months for annual exam and to office for IUD insertion.   8. Call your insurance company to verify coverage prior to completing any ordered tests.     Subjective     HPI   Ayesha Tomlinson is a 24 y.o. female who presents for annual well woman exam.     GYN:  Menses resumed this month. Lasted 3-4 days.   denies vaginal discharge, labial erythema   She endorses \"skin tag\" to right labia.   denies vaginal itching, irritation and dryness.  Contraception: desires liletta .  Patient is  sexually active with . denies issus with intercourse. She declines STI testing.   (+) gynecologic surgeries -C/S x2.     OB:  OB History    Para Term  AB Living   2 2 2   2   SAB IAB Ectopic Multiple Live Births      0 2      # Outcome Date GA Lbr Marc/2nd Weight Sex Type Anes PTL Lv   2 Term 12/10/24 40w1d  3605 g (7 lb 15.2 oz) F CS-LTranv EPI N MICHI   1 Term 17 40w5d  3215 g (7 lb 1.4 oz) F CS-LTranv Spinal  MICHI         :  denies dysuria, urinary frequency or urgency.  denies iincontinence.    Breast:  denies " breast mass, skin changes, dimpling, reddening, nipple retraction.  denies breast discharge.  denies breast tenderness bilaterally  Is currently breastfeeding    Patient does have a family history of breast, endometrial, colon, or ovarian ca. Mother has history of cervical cancer     Health Maintenance:    Active lifestyle  She does follow a well balanced diet.    She does use tobacco daily and social alcohol  She does follow with a PCP.    Social History:  Social History     Socioeconomic History    Marital status: Single     Spouse name: Not on file    Number of children: Not on file    Years of education: Not on file    Highest education level: Not on file   Occupational History    Not on file   Tobacco Use    Smoking status: Every Day     Current packs/day: 0.50     Types: Cigarettes    Smokeless tobacco: Never   Vaping Use    Vaping status: Former    Substances: Nicotine, THC, CBD, Flavoring   Substance and Sexual Activity    Alcohol use: Yes     Alcohol/week: 6.0 - 8.0 standard drinks of alcohol     Types: 2 Glasses of wine, 4 - 6 Shots of liquor per week     Comment: occ    Drug use: Not Currently     Types: Marijuana     Comment: rare    Sexual activity: Not Currently     Partners: Female, Male     Comment: recovering from pregnancy   Other Topics Concern    Not on file   Social History Narrative    Not on file     Social Drivers of Health     Financial Resource Strain: Not on file   Food Insecurity: Food Insecurity Present (12/13/2024)    Nursing - Inadequate Food Risk Classification     Worried About Running Out of Food in the Last Year: Sometimes true     Ran Out of Food in the Last Year: Patient declined     Ran Out of Food in the Last Year: Sometimes true   Transportation Needs: No Transportation Needs (12/13/2024)    Nursing - Transportation Risk Classification     Lack of Transportation: Not on file     Lack of Transportation: No   Physical Activity: Not on file   Stress: Not on file   Social  Connections: Not on file   Intimate Partner Violence: Unknown (2024)    Nursing IPS     Feels Physically and Emotionally Safe: Not on file     Physically Hurt by Someone: Not on file     Humiliated or Emotionally Abused by Someone: Not on file     Physically Hurt by Someone: No     Hurt or Threatened by Someone: No   Housing Stability: Unknown (2024)    Nursing: Inadequate Housing Risk Classification     Has Housing: Not on file     Worried About Losing Housing: Not on file     Unable to Get Utilities: Not on file     Unable to Pay for Housing in the Last Year: No     Has Housin       Social History     Tobacco Use    Smoking status: Every Day     Current packs/day: 0.50     Types: Cigarettes    Smokeless tobacco: Never   Vaping Use    Vaping status: Former    Substances: Nicotine, THC, CBD, Flavoring   Substance Use Topics    Alcohol use: Yes     Alcohol/week: 6.0 - 8.0 standard drinks of alcohol     Types: 2 Glasses of wine, 4 - 6 Shots of liquor per week     Comment: occ    Drug use: Not Currently     Types: Marijuana     Comment: rare       Screening:  Cervical cancer: last pap smear in 2024. Results were NILM. Patient has received Gardasil vaccine.   History of abnormal pap smears: alisha  Breast cancer: Will begin at age 40 per ACOG guidelines.   Colon cancer: Will begin at age 45.   STD screening: declined.    Review of Systems   Constitutional: Negative.  Negative for activity change, appetite change and fever.   Respiratory:  Negative for shortness of breath.    Cardiovascular:  Negative for chest pain.   Gastrointestinal:  Negative for abdominal pain, constipation, diarrhea and vomiting.   Genitourinary:  Negative for dyspareunia, dysuria, frequency, menstrual problem, pelvic pain, vaginal bleeding, vaginal discharge and vaginal pain.   Skin:  Negative for color change.   Psychiatric/Behavioral: Negative.     All other systems reviewed and are negative.      The following portions of  the patient's history were reviewed and updated as appropriate: allergies, current medications, past family history, past medical history, past social history, past surgical history, and problem list.         OB History          2    Para   2    Term   2            AB        Living   2         SAB        IAB        Ectopic        Multiple   0    Live Births   2                 Past Medical History:   Diagnosis Date    Allergic     Anemia     Anxiety     Depression     previously on Prozac - d/c age 11    Headache(784.0)     Kidney stone     Migraine     Varicella     vaccinated       Past Surgical History:   Procedure Laterality Date     SECTION  2017    CHOLECYSTECTOMY  2017    AR  DELIVERY ONLY N/A 12/10/2024    Procedure:  SECTION ();  Surgeon: Paula Correia MD;  Location: [facility] ;  Service: Obstetrics       Family History   Problem Relation Age of Onset    Cancer Mother         cervical    Migraines Mother     Hypertension Sister         gestational    Migraines Sister     Pulmonary embolism Maternal Grandmother          Current Outpatient Medications:     ibuprofen (MOTRIN) 600 mg tablet, Take 1 tablet (600 mg total) by mouth every 6 (six) hours, Disp: 30 tablet, Rfl: 0    acetaminophen (TYLENOL) 500 mg tablet, Take 2 tablets (1,000 mg total) by mouth every 6 (six) hours as needed for moderate pain, mild pain or headaches (Patient not taking: Reported on 3/17/2025), Disp: 30 tablet, Rfl: 0    calcium carbonate (TUMS) 500 mg chewable tablet, Chew 1 tablet daily (Patient not taking: Reported on 3/17/2025), Disp: , Rfl:     nystatin (MYCOSTATIN) powder, Apply topically 2 (two) times a day (Patient not taking: Reported on 3/17/2025), Disp: 30 g, Rfl: 0    Prenatal Vit-Fe Fumarate-FA (PRENATAL VITAMINS PO), Take 2 tablets by mouth gummies (Patient not taking: Reported on 3/17/2025), Disp: , Rfl:     No Known Allergies    Objective   Vitals:    25  1331   BP: 114/82   BP Location: Left arm   Patient Position: Sitting   Cuff Size: Standard   Weight: 86.3 kg (190 lb 3.2 oz)     Physical Exam  Vitals and nursing note reviewed.   Constitutional:       General: Silverio is not in acute distress.     Appearance: Normal appearance. Dom is not ill-appearing.   HENT:      Head: Normocephalic.   Neck:      Thyroid: No thyromegaly or thyroid tenderness.   Cardiovascular:      Rate and Rhythm: Normal rate and regular rhythm.      Heart sounds: Normal heart sounds.   Pulmonary:      Effort: Pulmonary effort is normal. No respiratory distress.      Breath sounds: Normal breath sounds.   Chest:   Breasts:     Right: Normal. No inverted nipple, nipple discharge, skin change or tenderness.      Left: Normal. No inverted nipple, nipple discharge, skin change or tenderness.   Abdominal:      General: Abdomen is flat.      Palpations: Abdomen is soft.      Tenderness: There is no abdominal tenderness. There is no guarding.   Genitourinary:     General: Normal vulva.      Exam position: Lithotomy position.      Labia:         Right: No rash, tenderness or lesion.         Left: No rash, tenderness or lesion.       Urethra: No prolapse.      Vagina: Normal. No vaginal discharge, erythema, tenderness or lesions.      Cervix: Normal. No cervical motion tenderness, discharge or lesion.      Uterus: Not tender and no uterine prolapse.       Adnexa:         Right: No tenderness.          Left: No tenderness.     Musculoskeletal:      Cervical back: Neck supple.   Lymphadenopathy:      Cervical: No cervical adenopathy.   Skin:     General: Skin is warm and dry.      Capillary Refill: Capillary refill takes less than 2 seconds.   Neurological:      General: No focal deficit present.      Mental Status: Silverio is alert and oriented to person, place, and time.   Psychiatric:         Mood and Affect: Mood normal.         Behavior: Behavior normal.         Thought Content: Thought content normal.        Holly TABARES  OB/GYN  3/17/2025  1:53 PM

## 2025-03-17 ENCOUNTER — ANNUAL EXAM (OUTPATIENT)
Dept: OBGYN CLINIC | Facility: CLINIC | Age: 25
End: 2025-03-17

## 2025-03-17 VITALS — DIASTOLIC BLOOD PRESSURE: 82 MMHG | WEIGHT: 190.2 LBS | BODY MASS INDEX: 33.69 KG/M2 | SYSTOLIC BLOOD PRESSURE: 114 MMHG

## 2025-03-17 DIAGNOSIS — Z01.419 WOMEN'S ANNUAL ROUTINE GYNECOLOGICAL EXAMINATION: Primary | ICD-10-CM

## 2025-04-22 ENCOUNTER — TELEPHONE (OUTPATIENT)
Age: 25
End: 2025-04-22

## 2025-04-22 NOTE — TELEPHONE ENCOUNTER
Patient called in to review if taking My Way as an emergency contraceptive is safe to use while breastfeeding. Reviewed information on [facility] regarding safety with patient. Patients who are breastfeeding may use levonorgestrel for emergency contraception. Patient verbalized understanding and is thankful.

## (undated) DEVICE — MEDI-VAC YANKAUER SUCTION HANDLE W/STRAIGHT TIP & CONTROL VENT: Brand: CARDINAL HEALTH

## (undated) DEVICE — GLOVE PI ULTRA TOUCH SZ 6

## (undated) DEVICE — SUT MONOCRYL 2-0 CT-1 27 IN Y339H

## (undated) DEVICE — DRESSING MEPILEX AG BORDER POST-OP 4 X 12 IN

## (undated) DEVICE — SKIN MARKER DUAL TIP WITH RULER CAP, FLEXIBLE RULER AND LABELS: Brand: DEVON

## (undated) DEVICE — GLOVE INDICATOR PI UNDERGLOVE SZ 6.5 BLUE

## (undated) DEVICE — SUT VICRYL 0 CT-1 36 IN J946H

## (undated) DEVICE — SUT MONOCRYL 2-0 CT 27 IN Y351H

## (undated) DEVICE — PACK C-SECTION PBDS

## (undated) DEVICE — SUT MONOCRYL 0 CTX 36 IN Y398H

## (undated) DEVICE — ADHESIVE SKIN HIGH VISCOSITY EXOFIN 1ML

## (undated) DEVICE — Device

## (undated) DEVICE — CHLORAPREP HI-LITE 26ML ORANGE

## (undated) DEVICE — SUT MONOCRYL 4-0 PS-2 18 IN Y496G

## (undated) DEVICE — SUT PLAIN 2-0 CTX 27 IN 872H

## (undated) DEVICE — SUT VICRYL 0 CTX 36 IN J978H